# Patient Record
Sex: FEMALE | Race: WHITE | NOT HISPANIC OR LATINO | URBAN - METROPOLITAN AREA
[De-identification: names, ages, dates, MRNs, and addresses within clinical notes are randomized per-mention and may not be internally consistent; named-entity substitution may affect disease eponyms.]

---

## 2017-01-01 DIAGNOSIS — H90.3 SENSORINEURAL HEARING LOSS, BILATERAL: ICD-10-CM

## 2017-05-10 ENCOUNTER — OUTPATIENT (OUTPATIENT)
Dept: OUTPATIENT SERVICES | Facility: HOSPITAL | Age: 82
LOS: 1 days | Discharge: HOME | End: 2017-05-10

## 2018-01-01 ENCOUNTER — INPATIENT (INPATIENT)
Facility: HOSPITAL | Age: 83
LOS: 11 days | End: 2018-05-19
Attending: INTERNAL MEDICINE | Admitting: INTERNAL MEDICINE

## 2018-01-01 VITALS
RESPIRATION RATE: 20 BRPM | SYSTOLIC BLOOD PRESSURE: 98 MMHG | WEIGHT: 194.01 LBS | TEMPERATURE: 98 F | OXYGEN SATURATION: 96 % | HEART RATE: 116 BPM | DIASTOLIC BLOOD PRESSURE: 66 MMHG

## 2018-01-01 VITALS
HEART RATE: 116 BPM | TEMPERATURE: 98 F | DIASTOLIC BLOOD PRESSURE: 42 MMHG | SYSTOLIC BLOOD PRESSURE: 67 MMHG | RESPIRATION RATE: 19 BRPM

## 2018-01-01 DIAGNOSIS — K56.609 UNSPECIFIED INTESTINAL OBSTRUCTION, UNSPECIFIED AS TO PARTIAL VERSUS COMPLETE OBSTRUCTION: ICD-10-CM

## 2018-01-01 DIAGNOSIS — J69.0 PNEUMONITIS DUE TO INHALATION OF FOOD AND VOMIT: ICD-10-CM

## 2018-01-01 DIAGNOSIS — D49.0 NEOPLASM OF UNSPECIFIED BEHAVIOR OF DIGESTIVE SYSTEM: ICD-10-CM

## 2018-01-01 DIAGNOSIS — I10 ESSENTIAL (PRIMARY) HYPERTENSION: ICD-10-CM

## 2018-01-01 DIAGNOSIS — D64.9 ANEMIA, UNSPECIFIED: ICD-10-CM

## 2018-01-01 DIAGNOSIS — K56.601 COMPLETE INTESTINAL OBSTRUCTION, UNSPECIFIED AS TO CAUSE: ICD-10-CM

## 2018-01-01 LAB
ALBUMIN SERPL ELPH-MCNC: 2.2 G/DL — LOW (ref 3.5–5.2)
ALBUMIN SERPL ELPH-MCNC: 2.3 G/DL — LOW (ref 3.5–5.2)
ALBUMIN SERPL ELPH-MCNC: 2.4 G/DL — LOW (ref 3.5–5.2)
ALBUMIN SERPL ELPH-MCNC: 2.6 G/DL — LOW (ref 3.5–5.2)
ALBUMIN SERPL ELPH-MCNC: 2.7 G/DL — LOW (ref 3.5–5.2)
ALBUMIN SERPL ELPH-MCNC: 2.9 G/DL — LOW (ref 3.5–5.2)
ALLERGY+IMMUNOLOGY DIAG STUDY NOTE: SIGNIFICANT CHANGE UP
ALP SERPL-CCNC: 108 U/L — SIGNIFICANT CHANGE UP (ref 30–115)
ALP SERPL-CCNC: 62 U/L — SIGNIFICANT CHANGE UP (ref 30–115)
ALP SERPL-CCNC: 69 U/L — SIGNIFICANT CHANGE UP (ref 30–115)
ALP SERPL-CCNC: 78 U/L — SIGNIFICANT CHANGE UP (ref 30–115)
ALP SERPL-CCNC: 84 U/L — SIGNIFICANT CHANGE UP (ref 30–115)
ALP SERPL-CCNC: 87 U/L — SIGNIFICANT CHANGE UP (ref 30–115)
ALT FLD-CCNC: 6 U/L — SIGNIFICANT CHANGE UP (ref 0–41)
ALT FLD-CCNC: 6 U/L — SIGNIFICANT CHANGE UP (ref 0–41)
ALT FLD-CCNC: <5 U/L — SIGNIFICANT CHANGE UP (ref 0–41)
ANION GAP SERPL CALC-SCNC: 15 MMOL/L — HIGH (ref 7–14)
ANION GAP SERPL CALC-SCNC: 16 MMOL/L — HIGH (ref 7–14)
ANION GAP SERPL CALC-SCNC: 20 MMOL/L — HIGH (ref 7–14)
ANION GAP SERPL CALC-SCNC: 21 MMOL/L — HIGH (ref 7–14)
ANION GAP SERPL CALC-SCNC: 23 MMOL/L — HIGH (ref 7–14)
ANION GAP SERPL CALC-SCNC: 24 MMOL/L — HIGH (ref 7–14)
ANION GAP SERPL CALC-SCNC: 26 MMOL/L — HIGH (ref 7–14)
ANION GAP SERPL CALC-SCNC: 26 MMOL/L — HIGH (ref 7–14)
ANION GAP SERPL CALC-SCNC: 27 MMOL/L — HIGH (ref 7–14)
ANION GAP SERPL CALC-SCNC: 27 MMOL/L — HIGH (ref 7–14)
ANISOCYTOSIS BLD QL: SLIGHT — SIGNIFICANT CHANGE UP
APPEARANCE UR: (no result)
APTT BLD: 21.2 SEC — CRITICAL LOW (ref 27–39.2)
APTT BLD: 27.6 SEC — SIGNIFICANT CHANGE UP (ref 27–39.2)
APTT BLD: 31.4 SEC — SIGNIFICANT CHANGE UP (ref 27–39.2)
AST SERPL-CCNC: 10 U/L — SIGNIFICANT CHANGE UP (ref 0–41)
AST SERPL-CCNC: 11 U/L — SIGNIFICANT CHANGE UP (ref 0–41)
AST SERPL-CCNC: 12 U/L — SIGNIFICANT CHANGE UP (ref 0–41)
AST SERPL-CCNC: 12 U/L — SIGNIFICANT CHANGE UP (ref 0–41)
AST SERPL-CCNC: 13 U/L — SIGNIFICANT CHANGE UP (ref 0–41)
AST SERPL-CCNC: 13 U/L — SIGNIFICANT CHANGE UP (ref 0–41)
BACTERIA # UR AUTO: (no result) /HPF
BASE EXCESS BLDV CALC-SCNC: 9.9 MMOL/L — HIGH (ref -2–2)
BASOPHILS # BLD AUTO: 0 K/UL — SIGNIFICANT CHANGE UP (ref 0–0.2)
BASOPHILS NFR BLD AUTO: 0 % — SIGNIFICANT CHANGE UP (ref 0–1)
BILIRUB SERPL-MCNC: <0.2 MG/DL — SIGNIFICANT CHANGE UP (ref 0.2–1.2)
BILIRUB UR-MCNC: (no result)
BUN SERPL-MCNC: 11 MG/DL — SIGNIFICANT CHANGE UP (ref 10–20)
BUN SERPL-MCNC: 13 MG/DL — SIGNIFICANT CHANGE UP (ref 10–20)
BUN SERPL-MCNC: 14 MG/DL — SIGNIFICANT CHANGE UP (ref 10–20)
BUN SERPL-MCNC: 17 MG/DL — SIGNIFICANT CHANGE UP (ref 10–20)
BUN SERPL-MCNC: 19 MG/DL — SIGNIFICANT CHANGE UP (ref 10–20)
BUN SERPL-MCNC: 3 MG/DL — LOW (ref 10–20)
BUN SERPL-MCNC: 4 MG/DL — LOW (ref 10–20)
BUN SERPL-MCNC: 4 MG/DL — LOW (ref 10–20)
BUN SERPL-MCNC: 5 MG/DL — LOW (ref 10–20)
BUN SERPL-MCNC: 6 MG/DL — LOW (ref 10–20)
BUN SERPL-MCNC: <3 MG/DL — LOW (ref 10–20)
BUN SERPL-MCNC: <3 MG/DL — LOW (ref 10–20)
CA-I SERPL-SCNC: 1.19 MMOL/L — SIGNIFICANT CHANGE UP (ref 1.12–1.3)
CALCIUM SERPL-MCNC: 7.5 MG/DL — LOW (ref 8.5–10.1)
CALCIUM SERPL-MCNC: 7.6 MG/DL — LOW (ref 8.5–10.1)
CALCIUM SERPL-MCNC: 7.7 MG/DL — LOW (ref 8.5–10.1)
CALCIUM SERPL-MCNC: 7.8 MG/DL — LOW (ref 8.5–10.1)
CALCIUM SERPL-MCNC: 8 MG/DL — LOW (ref 8.5–10.1)
CALCIUM SERPL-MCNC: 8 MG/DL — LOW (ref 8.5–10.1)
CALCIUM SERPL-MCNC: 8.1 MG/DL — LOW (ref 8.5–10.1)
CALCIUM SERPL-MCNC: 8.3 MG/DL — LOW (ref 8.5–10.1)
CALCIUM SERPL-MCNC: 8.5 MG/DL — SIGNIFICANT CHANGE UP (ref 8.5–10.1)
CALCIUM SERPL-MCNC: 8.9 MG/DL — SIGNIFICANT CHANGE UP (ref 8.5–10.1)
CARBAMAZEPINE SERPL-MCNC: 3.4 UG/ML — LOW (ref 4–12)
CEA SERPL-MCNC: 20.7 NG/ML — HIGH (ref 0–3.8)
CHLORIDE SERPL-SCNC: 101 MMOL/L — SIGNIFICANT CHANGE UP (ref 98–110)
CHLORIDE SERPL-SCNC: 101 MMOL/L — SIGNIFICANT CHANGE UP (ref 98–110)
CHLORIDE SERPL-SCNC: 102 MMOL/L — SIGNIFICANT CHANGE UP (ref 98–110)
CHLORIDE SERPL-SCNC: 104 MMOL/L — SIGNIFICANT CHANGE UP (ref 98–110)
CHLORIDE SERPL-SCNC: 106 MMOL/L — SIGNIFICANT CHANGE UP (ref 98–110)
CHLORIDE SERPL-SCNC: 109 MMOL/L — SIGNIFICANT CHANGE UP (ref 98–110)
CHLORIDE SERPL-SCNC: 91 MMOL/L — LOW (ref 98–110)
CHLORIDE SERPL-SCNC: 95 MMOL/L — LOW (ref 98–110)
CO2 SERPL-SCNC: 18 MMOL/L — SIGNIFICANT CHANGE UP (ref 17–32)
CO2 SERPL-SCNC: 18 MMOL/L — SIGNIFICANT CHANGE UP (ref 17–32)
CO2 SERPL-SCNC: 19 MMOL/L — SIGNIFICANT CHANGE UP (ref 17–32)
CO2 SERPL-SCNC: 20 MMOL/L — SIGNIFICANT CHANGE UP (ref 17–32)
CO2 SERPL-SCNC: 21 MMOL/L — SIGNIFICANT CHANGE UP (ref 17–32)
CO2 SERPL-SCNC: 22 MMOL/L — SIGNIFICANT CHANGE UP (ref 17–32)
CO2 SERPL-SCNC: 23 MMOL/L — SIGNIFICANT CHANGE UP (ref 17–32)
CO2 SERPL-SCNC: 25 MMOL/L — SIGNIFICANT CHANGE UP (ref 17–32)
CO2 SERPL-SCNC: 25 MMOL/L — SIGNIFICANT CHANGE UP (ref 17–32)
CO2 SERPL-SCNC: 27 MMOL/L — SIGNIFICANT CHANGE UP (ref 17–32)
CO2 SERPL-SCNC: 29 MMOL/L — SIGNIFICANT CHANGE UP (ref 17–32)
CO2 SERPL-SCNC: 30 MMOL/L — SIGNIFICANT CHANGE UP (ref 17–32)
COD CRY URNS QL: NEGATIVE — SIGNIFICANT CHANGE UP
COLOR SPEC: YELLOW — SIGNIFICANT CHANGE UP
CREAT SERPL-MCNC: 0.5 MG/DL — LOW (ref 0.7–1.5)
CREAT SERPL-MCNC: 0.6 MG/DL — LOW (ref 0.7–1.5)
CREAT SERPL-MCNC: 0.7 MG/DL — SIGNIFICANT CHANGE UP (ref 0.7–1.5)
CREAT SERPL-MCNC: 0.7 MG/DL — SIGNIFICANT CHANGE UP (ref 0.7–1.5)
CULTURE RESULTS: SIGNIFICANT CHANGE UP
CULTURE RESULTS: SIGNIFICANT CHANGE UP
DIFF PNL FLD: (no result)
EOSINOPHIL NFR BLD AUTO: 0 % — SIGNIFICANT CHANGE UP (ref 0–8)
EPI CELLS # UR: (no result) /HPF
GAS PNL BLDV: 145 MMOL/L — SIGNIFICANT CHANGE UP (ref 136–145)
GAS PNL BLDV: SIGNIFICANT CHANGE UP
GLUCOSE SERPL-MCNC: 106 MG/DL — HIGH (ref 70–99)
GLUCOSE SERPL-MCNC: 106 MG/DL — HIGH (ref 70–99)
GLUCOSE SERPL-MCNC: 108 MG/DL — HIGH (ref 70–99)
GLUCOSE SERPL-MCNC: 108 MG/DL — HIGH (ref 70–99)
GLUCOSE SERPL-MCNC: 130 MG/DL — HIGH (ref 70–99)
GLUCOSE SERPL-MCNC: 139 MG/DL — HIGH (ref 70–99)
GLUCOSE SERPL-MCNC: 143 MG/DL — HIGH (ref 70–99)
GLUCOSE SERPL-MCNC: 72 MG/DL — SIGNIFICANT CHANGE UP (ref 70–99)
GLUCOSE SERPL-MCNC: 72 MG/DL — SIGNIFICANT CHANGE UP (ref 70–99)
GLUCOSE SERPL-MCNC: 75 MG/DL — SIGNIFICANT CHANGE UP (ref 70–99)
GLUCOSE SERPL-MCNC: 80 MG/DL — SIGNIFICANT CHANGE UP (ref 70–99)
GLUCOSE SERPL-MCNC: 86 MG/DL — SIGNIFICANT CHANGE UP (ref 70–99)
GLUCOSE UR QL: NEGATIVE MG/DL — SIGNIFICANT CHANGE UP
GRAN CASTS # UR COMP ASSIST: NEGATIVE — SIGNIFICANT CHANGE UP
HCO3 BLDV-SCNC: 36 MMOL/L — HIGH (ref 22–29)
HCT VFR BLD CALC: 27.3 % — LOW (ref 37–47)
HCT VFR BLD CALC: 27.8 % — LOW (ref 37–47)
HCT VFR BLD CALC: 28.9 % — LOW (ref 37–47)
HCT VFR BLD CALC: 29.1 % — LOW (ref 37–47)
HCT VFR BLD CALC: 29.4 % — LOW (ref 37–47)
HCT VFR BLD CALC: 30.7 % — LOW (ref 37–47)
HCT VFR BLD CALC: 31.4 % — LOW (ref 37–47)
HCT VFR BLD CALC: 36.4 % — LOW (ref 37–47)
HCT VFR BLDA CALC: 35.2 % — SIGNIFICANT CHANGE UP (ref 34–44)
HGB BLD CALC-MCNC: 11.5 G/DL — LOW (ref 14–18)
HGB BLD-MCNC: 11.3 G/DL — LOW (ref 12–16)
HGB BLD-MCNC: 8.2 G/DL — LOW (ref 12–16)
HGB BLD-MCNC: 8.6 G/DL — LOW (ref 12–16)
HGB BLD-MCNC: 8.7 G/DL — LOW (ref 12–16)
HGB BLD-MCNC: 8.8 G/DL — LOW (ref 12–16)
HGB BLD-MCNC: 8.8 G/DL — LOW (ref 12–16)
HGB BLD-MCNC: 9.1 G/DL — LOW (ref 12–16)
HGB BLD-MCNC: 9.6 G/DL — LOW (ref 12–16)
HOROWITZ INDEX BLDV+IHG-RTO: 21 — SIGNIFICANT CHANGE UP
HYALINE CASTS # UR AUTO: (no result) /LPF
HYPOCHROMIA BLD QL: SLIGHT — SIGNIFICANT CHANGE UP
INR BLD: 1.18 RATIO — SIGNIFICANT CHANGE UP (ref 0.65–1.3)
INR BLD: 1.53 RATIO — HIGH (ref 0.65–1.3)
INR BLD: 1.63 RATIO — HIGH (ref 0.65–1.3)
KETONES UR-MCNC: (no result)
LACTATE BLDV-MCNC: 2.2 MMOL/L — HIGH (ref 0.5–1.6)
LACTATE SERPL-SCNC: 1.9 MMOL/L — SIGNIFICANT CHANGE UP (ref 0.5–2.2)
LEUKOCYTE ESTERASE UR-ACNC: (no result)
LIDOCAIN IGE QN: 18 U/L — SIGNIFICANT CHANGE UP (ref 7–60)
LYMPHOCYTES # BLD AUTO: 2.01 K/UL — SIGNIFICANT CHANGE UP (ref 1.2–3.4)
LYMPHOCYTES # BLD AUTO: 7 % — LOW (ref 20.5–51.1)
MAGNESIUM SERPL-MCNC: 2 MG/DL — SIGNIFICANT CHANGE UP (ref 1.8–2.4)
MAGNESIUM SERPL-MCNC: 2.1 MG/DL — SIGNIFICANT CHANGE UP (ref 1.8–2.4)
MAGNESIUM SERPL-MCNC: 2.1 MG/DL — SIGNIFICANT CHANGE UP (ref 1.8–2.4)
MAGNESIUM SERPL-MCNC: 2.2 MG/DL — SIGNIFICANT CHANGE UP (ref 1.8–2.4)
MAGNESIUM SERPL-MCNC: 2.2 MG/DL — SIGNIFICANT CHANGE UP (ref 1.8–2.4)
MAGNESIUM SERPL-MCNC: 2.3 MG/DL — SIGNIFICANT CHANGE UP (ref 1.8–2.4)
MANUAL SMEAR VERIFICATION: SIGNIFICANT CHANGE UP
MCHC RBC-ENTMCNC: 27.1 PG — SIGNIFICANT CHANGE UP (ref 27–31)
MCHC RBC-ENTMCNC: 27.1 PG — SIGNIFICANT CHANGE UP (ref 27–31)
MCHC RBC-ENTMCNC: 27.4 PG — SIGNIFICANT CHANGE UP (ref 27–31)
MCHC RBC-ENTMCNC: 27.5 PG — SIGNIFICANT CHANGE UP (ref 27–31)
MCHC RBC-ENTMCNC: 27.7 PG — SIGNIFICANT CHANGE UP (ref 27–31)
MCHC RBC-ENTMCNC: 27.8 PG — SIGNIFICANT CHANGE UP (ref 27–31)
MCHC RBC-ENTMCNC: 27.8 PG — SIGNIFICANT CHANGE UP (ref 27–31)
MCHC RBC-ENTMCNC: 28.2 PG — SIGNIFICANT CHANGE UP (ref 27–31)
MCHC RBC-ENTMCNC: 29.6 G/DL — LOW (ref 32–37)
MCHC RBC-ENTMCNC: 29.6 G/DL — LOW (ref 32–37)
MCHC RBC-ENTMCNC: 29.9 G/DL — LOW (ref 32–37)
MCHC RBC-ENTMCNC: 30 G/DL — LOW (ref 32–37)
MCHC RBC-ENTMCNC: 30.4 G/DL — LOW (ref 32–37)
MCHC RBC-ENTMCNC: 30.6 G/DL — LOW (ref 32–37)
MCHC RBC-ENTMCNC: 31 G/DL — LOW (ref 32–37)
MCHC RBC-ENTMCNC: 31.3 G/DL — LOW (ref 32–37)
MCV RBC AUTO: 89.4 FL — SIGNIFICANT CHANGE UP (ref 81–99)
MCV RBC AUTO: 90 FL — SIGNIFICANT CHANGE UP (ref 81–99)
MCV RBC AUTO: 90.1 FL — SIGNIFICANT CHANGE UP (ref 81–99)
MCV RBC AUTO: 90.3 FL — SIGNIFICANT CHANGE UP (ref 81–99)
MCV RBC AUTO: 90.8 FL — SIGNIFICANT CHANGE UP (ref 81–99)
MCV RBC AUTO: 91.4 FL — SIGNIFICANT CHANGE UP (ref 81–99)
MCV RBC AUTO: 92.7 FL — SIGNIFICANT CHANGE UP (ref 81–99)
MCV RBC AUTO: 93 FL — SIGNIFICANT CHANGE UP (ref 81–99)
MONOCYTES # BLD AUTO: 2.3 K/UL — HIGH (ref 0.1–0.6)
MONOCYTES NFR BLD AUTO: 8 % — SIGNIFICANT CHANGE UP (ref 1.7–9.3)
NEUTROPHILS # BLD AUTO: 24.44 K/UL — HIGH (ref 1.4–6.5)
NEUTROPHILS NFR BLD AUTO: 85 % — HIGH (ref 42.2–75.2)
NITRITE UR-MCNC: NEGATIVE — SIGNIFICANT CHANGE UP
NRBC # BLD: 0 /100 WBCS — SIGNIFICANT CHANGE UP (ref 0–0)
NRBC # BLD: 0 /100 — SIGNIFICANT CHANGE UP (ref 0–0)
NRBC # BLD: SIGNIFICANT CHANGE UP /100 WBCS (ref 0–0)
PCO2 BLDV: 57 MMHG — HIGH (ref 41–51)
PH BLDV: 7.41 — SIGNIFICANT CHANGE UP (ref 7.26–7.43)
PH UR: 6 — SIGNIFICANT CHANGE UP (ref 5–8)
PHOSPHATE SERPL-MCNC: 1.1 MG/DL — LOW (ref 2.1–4.9)
PHOSPHATE SERPL-MCNC: 1.7 MG/DL — LOW (ref 2.1–4.9)
PHOSPHATE SERPL-MCNC: 1.9 MG/DL — LOW (ref 2.1–4.9)
PLAT MORPH BLD: (no result)
PLATELET # BLD AUTO: 427 K/UL — HIGH (ref 130–400)
PLATELET # BLD AUTO: 477 K/UL — HIGH (ref 130–400)
PLATELET # BLD AUTO: 485 K/UL — HIGH (ref 130–400)
PLATELET # BLD AUTO: 535 K/UL — HIGH (ref 130–400)
PLATELET # BLD AUTO: 565 K/UL — HIGH (ref 130–400)
PLATELET # BLD AUTO: 577 K/UL — HIGH (ref 130–400)
PLATELET # BLD AUTO: 614 K/UL — HIGH (ref 130–400)
PLATELET # BLD AUTO: 677 K/UL — HIGH (ref 130–400)
PLATELET COUNT - ESTIMATE: (no result)
PO2 BLDV: 37 MMHG — SIGNIFICANT CHANGE UP (ref 20–40)
POIKILOCYTOSIS BLD QL AUTO: SLIGHT — SIGNIFICANT CHANGE UP
POTASSIUM BLDV-SCNC: 2.9 MMOL/L — LOW (ref 3.3–5.6)
POTASSIUM SERPL-MCNC: 3.2 MMOL/L — LOW (ref 3.5–5)
POTASSIUM SERPL-MCNC: 3.2 MMOL/L — LOW (ref 3.5–5)
POTASSIUM SERPL-MCNC: 3.3 MMOL/L — LOW (ref 3.5–5)
POTASSIUM SERPL-MCNC: 3.6 MMOL/L — SIGNIFICANT CHANGE UP (ref 3.5–5)
POTASSIUM SERPL-MCNC: 3.6 MMOL/L — SIGNIFICANT CHANGE UP (ref 3.5–5)
POTASSIUM SERPL-MCNC: 3.7 MMOL/L — SIGNIFICANT CHANGE UP (ref 3.5–5)
POTASSIUM SERPL-MCNC: 3.7 MMOL/L — SIGNIFICANT CHANGE UP (ref 3.5–5)
POTASSIUM SERPL-MCNC: 3.8 MMOL/L — SIGNIFICANT CHANGE UP (ref 3.5–5)
POTASSIUM SERPL-MCNC: 3.9 MMOL/L — SIGNIFICANT CHANGE UP (ref 3.5–5)
POTASSIUM SERPL-MCNC: 4 MMOL/L — SIGNIFICANT CHANGE UP (ref 3.5–5)
POTASSIUM SERPL-MCNC: 4.1 MMOL/L — SIGNIFICANT CHANGE UP (ref 3.5–5)
POTASSIUM SERPL-MCNC: 4.1 MMOL/L — SIGNIFICANT CHANGE UP (ref 3.5–5)
POTASSIUM SERPL-SCNC: 3.2 MMOL/L — LOW (ref 3.5–5)
POTASSIUM SERPL-SCNC: 3.2 MMOL/L — LOW (ref 3.5–5)
POTASSIUM SERPL-SCNC: 3.3 MMOL/L — LOW (ref 3.5–5)
POTASSIUM SERPL-SCNC: 3.6 MMOL/L — SIGNIFICANT CHANGE UP (ref 3.5–5)
POTASSIUM SERPL-SCNC: 3.6 MMOL/L — SIGNIFICANT CHANGE UP (ref 3.5–5)
POTASSIUM SERPL-SCNC: 3.7 MMOL/L — SIGNIFICANT CHANGE UP (ref 3.5–5)
POTASSIUM SERPL-SCNC: 3.7 MMOL/L — SIGNIFICANT CHANGE UP (ref 3.5–5)
POTASSIUM SERPL-SCNC: 3.8 MMOL/L — SIGNIFICANT CHANGE UP (ref 3.5–5)
POTASSIUM SERPL-SCNC: 3.9 MMOL/L — SIGNIFICANT CHANGE UP (ref 3.5–5)
POTASSIUM SERPL-SCNC: 4 MMOL/L — SIGNIFICANT CHANGE UP (ref 3.5–5)
POTASSIUM SERPL-SCNC: 4.1 MMOL/L — SIGNIFICANT CHANGE UP (ref 3.5–5)
POTASSIUM SERPL-SCNC: 4.1 MMOL/L — SIGNIFICANT CHANGE UP (ref 3.5–5)
PROT SERPL-MCNC: 4.8 G/DL — LOW (ref 6–8)
PROT SERPL-MCNC: 5 G/DL — LOW (ref 6–8)
PROT SERPL-MCNC: 5.1 G/DL — LOW (ref 6–8)
PROT SERPL-MCNC: 5.6 G/DL — LOW (ref 6–8)
PROT UR-MCNC: 30 MG/DL
PROTHROM AB SERPL-ACNC: 12.8 SEC — SIGNIFICANT CHANGE UP (ref 9.95–12.87)
PROTHROM AB SERPL-ACNC: 16.7 SEC — HIGH (ref 9.95–12.87)
PROTHROM AB SERPL-ACNC: 17.8 SEC — HIGH (ref 9.95–12.87)
RBC # BLD: 3.03 M/UL — LOW (ref 4.2–5.4)
RBC # BLD: 3.09 M/UL — LOW (ref 4.2–5.4)
RBC # BLD: 3.14 M/UL — LOW (ref 4.2–5.4)
RBC # BLD: 3.16 M/UL — LOW (ref 4.2–5.4)
RBC # BLD: 3.2 M/UL — LOW (ref 4.2–5.4)
RBC # BLD: 3.36 M/UL — LOW (ref 4.2–5.4)
RBC # BLD: 3.46 M/UL — LOW (ref 4.2–5.4)
RBC # BLD: 4.07 M/UL — LOW (ref 4.2–5.4)
RBC # FLD: 16.3 % — HIGH (ref 11.5–14.5)
RBC # FLD: 16.3 % — HIGH (ref 11.5–14.5)
RBC # FLD: 16.4 % — HIGH (ref 11.5–14.5)
RBC # FLD: 17 % — HIGH (ref 11.5–14.5)
RBC # FLD: 17.4 % — HIGH (ref 11.5–14.5)
RBC # FLD: 18.1 % — HIGH (ref 11.5–14.5)
RBC # FLD: 18.1 % — HIGH (ref 11.5–14.5)
RBC # FLD: 18.2 % — HIGH (ref 11.5–14.5)
RBC BLD AUTO: (no result)
RBC CASTS # UR COMP ASSIST: (no result) /HPF
SAO2 % BLDV: 62 % — SIGNIFICANT CHANGE UP
SODIUM SERPL-SCNC: 143 MMOL/L — SIGNIFICANT CHANGE UP (ref 135–146)
SODIUM SERPL-SCNC: 144 MMOL/L — SIGNIFICANT CHANGE UP (ref 135–146)
SODIUM SERPL-SCNC: 145 MMOL/L — SIGNIFICANT CHANGE UP (ref 135–146)
SODIUM SERPL-SCNC: 146 MMOL/L — SIGNIFICANT CHANGE UP (ref 135–146)
SODIUM SERPL-SCNC: 147 MMOL/L — HIGH (ref 135–146)
SODIUM SERPL-SCNC: 148 MMOL/L — HIGH (ref 135–146)
SODIUM SERPL-SCNC: 148 MMOL/L — HIGH (ref 135–146)
SODIUM SERPL-SCNC: 151 MMOL/L — HIGH (ref 135–146)
SODIUM SERPL-SCNC: 152 MMOL/L — HIGH (ref 135–146)
SODIUM SERPL-SCNC: 153 MMOL/L — HIGH (ref 135–146)
SODIUM SERPL-SCNC: 155 MMOL/L — HIGH (ref 135–146)
SODIUM SERPL-SCNC: 155 MMOL/L — HIGH (ref 135–146)
SP GR SPEC: 1.02 — SIGNIFICANT CHANGE UP (ref 1.01–1.03)
SPECIMEN SOURCE: SIGNIFICANT CHANGE UP
SPECIMEN SOURCE: SIGNIFICANT CHANGE UP
TRI-PHOS CRY UR QL COMP ASSIST: NEGATIVE — SIGNIFICANT CHANGE UP
TYPE + AB SCN PNL BLD: SIGNIFICANT CHANGE UP
URATE CRY FLD QL MICRO: NEGATIVE — SIGNIFICANT CHANGE UP
UROBILINOGEN FLD QL: 0.2 MG/DL — SIGNIFICANT CHANGE UP (ref 0.2–0.2)
WBC # BLD: 10.15 K/UL — SIGNIFICANT CHANGE UP (ref 4.8–10.8)
WBC # BLD: 11.01 K/UL — HIGH (ref 4.8–10.8)
WBC # BLD: 11.38 K/UL — HIGH (ref 4.8–10.8)
WBC # BLD: 11.79 K/UL — HIGH (ref 4.8–10.8)
WBC # BLD: 13.24 K/UL — HIGH (ref 4.8–10.8)
WBC # BLD: 21.84 K/UL — HIGH (ref 4.8–10.8)
WBC # BLD: 23.12 K/UL — HIGH (ref 4.8–10.8)
WBC # BLD: 28.75 K/UL — HIGH (ref 4.8–10.8)
WBC # FLD AUTO: 10.15 K/UL — SIGNIFICANT CHANGE UP (ref 4.8–10.8)
WBC # FLD AUTO: 11.01 K/UL — HIGH (ref 4.8–10.8)
WBC # FLD AUTO: 11.38 K/UL — HIGH (ref 4.8–10.8)
WBC # FLD AUTO: 11.79 K/UL — HIGH (ref 4.8–10.8)
WBC # FLD AUTO: 13.24 K/UL — HIGH (ref 4.8–10.8)
WBC # FLD AUTO: 21.84 K/UL — HIGH (ref 4.8–10.8)
WBC # FLD AUTO: 23.12 K/UL — HIGH (ref 4.8–10.8)
WBC # FLD AUTO: 28.75 K/UL — HIGH (ref 4.8–10.8)
WBC UR QL: (no result) /HPF

## 2018-01-01 RX ORDER — SODIUM CHLORIDE 9 MG/ML
1000 INJECTION, SOLUTION INTRAVENOUS
Qty: 0 | Refills: 0 | Status: DISCONTINUED | OUTPATIENT
Start: 2018-01-01 | End: 2018-01-01

## 2018-01-01 RX ORDER — FENTANYL CITRATE 50 UG/ML
1 INJECTION INTRAVENOUS
Qty: 0 | Refills: 0 | Status: DISCONTINUED | OUTPATIENT
Start: 2018-01-01 | End: 2018-01-01

## 2018-01-01 RX ORDER — IPRATROPIUM/ALBUTEROL SULFATE 18-103MCG
3 AEROSOL WITH ADAPTER (GRAM) INHALATION
Qty: 0 | Refills: 0 | COMMUNITY

## 2018-01-01 RX ORDER — CLOPIDOGREL BISULFATE 75 MG/1
75 TABLET, FILM COATED ORAL DAILY
Qty: 0 | Refills: 0 | Status: DISCONTINUED | OUTPATIENT
Start: 2018-01-01 | End: 2018-01-01

## 2018-01-01 RX ORDER — SODIUM CHLORIDE 9 MG/ML
3 INJECTION INTRAMUSCULAR; INTRAVENOUS; SUBCUTANEOUS ONCE
Qty: 0 | Refills: 0 | Status: COMPLETED | OUTPATIENT
Start: 2018-01-01 | End: 2018-01-01

## 2018-01-01 RX ORDER — RANITIDINE HYDROCHLORIDE 150 MG/1
0 TABLET, FILM COATED ORAL
Qty: 0 | Refills: 0 | COMMUNITY

## 2018-01-01 RX ORDER — DIATRIZOATE MEGLUMINE 180 MG/ML
20 INJECTION, SOLUTION INTRAVESICAL ONCE
Qty: 0 | Refills: 0 | Status: COMPLETED | OUTPATIENT
Start: 2018-01-01 | End: 2018-01-01

## 2018-01-01 RX ORDER — HEPARIN SODIUM 5000 [USP'U]/ML
5000 INJECTION INTRAVENOUS; SUBCUTANEOUS EVERY 12 HOURS
Qty: 0 | Refills: 0 | Status: DISCONTINUED | OUTPATIENT
Start: 2018-01-01 | End: 2018-01-01

## 2018-01-01 RX ORDER — POTASSIUM CHLORIDE 20 MEQ
20 PACKET (EA) ORAL ONCE
Qty: 0 | Refills: 0 | Status: COMPLETED | OUTPATIENT
Start: 2018-01-01 | End: 2018-01-01

## 2018-01-01 RX ORDER — CLOPIDOGREL BISULFATE 75 MG/1
1 TABLET, FILM COATED ORAL
Qty: 0 | Refills: 0 | COMMUNITY

## 2018-01-01 RX ORDER — METOPROLOL TARTRATE 50 MG
5 TABLET ORAL EVERY 6 HOURS
Qty: 0 | Refills: 0 | Status: DISCONTINUED | OUTPATIENT
Start: 2018-01-01 | End: 2018-01-01

## 2018-01-01 RX ORDER — METOPROLOL TARTRATE 50 MG
1 TABLET ORAL
Qty: 0 | Refills: 0 | COMMUNITY

## 2018-01-01 RX ORDER — METOPROLOL TARTRATE 50 MG
50 TABLET ORAL
Qty: 0 | Refills: 0 | Status: DISCONTINUED | OUTPATIENT
Start: 2018-01-01 | End: 2018-01-01

## 2018-01-01 RX ORDER — CETIRIZINE HYDROCHLORIDE 10 MG/1
10 TABLET ORAL DAILY
Qty: 0 | Refills: 0 | Status: DISCONTINUED | OUTPATIENT
Start: 2018-01-01 | End: 2018-01-01

## 2018-01-01 RX ORDER — SERTRALINE 25 MG/1
25 TABLET, FILM COATED ORAL DAILY
Qty: 0 | Refills: 0 | Status: DISCONTINUED | OUTPATIENT
Start: 2018-01-01 | End: 2018-01-01

## 2018-01-01 RX ORDER — CETIRIZINE HYDROCHLORIDE 10 MG/1
1 TABLET ORAL
Qty: 0 | Refills: 0 | COMMUNITY

## 2018-01-01 RX ORDER — CARBAMAZEPINE 200 MG
200 TABLET ORAL
Qty: 0 | Refills: 0 | Status: DISCONTINUED | OUTPATIENT
Start: 2018-01-01 | End: 2018-01-01

## 2018-01-01 RX ORDER — PANTOPRAZOLE SODIUM 20 MG/1
1 TABLET, DELAYED RELEASE ORAL
Qty: 0 | Refills: 0 | COMMUNITY

## 2018-01-01 RX ORDER — ZOLPIDEM TARTRATE 10 MG/1
5 TABLET ORAL AT BEDTIME
Qty: 0 | Refills: 0 | Status: DISCONTINUED | OUTPATIENT
Start: 2018-01-01 | End: 2018-01-01

## 2018-01-01 RX ORDER — FENTANYL CITRATE 50 UG/ML
1 INJECTION INTRAVENOUS
Qty: 0 | Refills: 0 | COMMUNITY

## 2018-01-01 RX ORDER — GABAPENTIN 400 MG/1
0 CAPSULE ORAL
Qty: 0 | Refills: 0 | COMMUNITY

## 2018-01-01 RX ORDER — SODIUM CHLORIDE 9 MG/ML
1000 INJECTION INTRAMUSCULAR; INTRAVENOUS; SUBCUTANEOUS ONCE
Qty: 0 | Refills: 0 | Status: COMPLETED | OUTPATIENT
Start: 2018-01-01 | End: 2018-01-01

## 2018-01-01 RX ORDER — SODIUM CHLORIDE 9 MG/ML
1000 INJECTION INTRAMUSCULAR; INTRAVENOUS; SUBCUTANEOUS
Qty: 0 | Refills: 0 | Status: DISCONTINUED | OUTPATIENT
Start: 2018-01-01 | End: 2018-01-01

## 2018-01-01 RX ORDER — KETOROLAC TROMETHAMINE 30 MG/ML
15 SYRINGE (ML) INJECTION ONCE
Qty: 0 | Refills: 0 | Status: DISCONTINUED | OUTPATIENT
Start: 2018-01-01 | End: 2018-01-01

## 2018-01-01 RX ORDER — BUDESONIDE, MICRONIZED 100 %
0.5 POWDER (GRAM) MISCELLANEOUS
Qty: 0 | Refills: 0 | Status: DISCONTINUED | OUTPATIENT
Start: 2018-01-01 | End: 2018-01-01

## 2018-01-01 RX ORDER — MORPHINE SULFATE 50 MG/1
2 CAPSULE, EXTENDED RELEASE ORAL ONCE
Qty: 0 | Refills: 0 | Status: DISCONTINUED | OUTPATIENT
Start: 2018-01-01 | End: 2018-01-01

## 2018-01-01 RX ORDER — LACTOBACILLUS ACIDOPHILUS 100MM CELL
1 CAPSULE ORAL DAILY
Qty: 0 | Refills: 0 | Status: DISCONTINUED | OUTPATIENT
Start: 2018-01-01 | End: 2018-01-01

## 2018-01-01 RX ORDER — PIPERACILLIN AND TAZOBACTAM 4; .5 G/20ML; G/20ML
3.38 INJECTION, POWDER, LYOPHILIZED, FOR SOLUTION INTRAVENOUS EVERY 8 HOURS
Qty: 0 | Refills: 0 | Status: DISCONTINUED | OUTPATIENT
Start: 2018-01-01 | End: 2018-01-01

## 2018-01-01 RX ORDER — MORPHINE SULFATE 50 MG/1
2 CAPSULE, EXTENDED RELEASE ORAL
Qty: 0 | Refills: 0 | Status: DISCONTINUED | OUTPATIENT
Start: 2018-01-01 | End: 2018-01-01

## 2018-01-01 RX ORDER — ACETAMINOPHEN 500 MG
650 TABLET ORAL EVERY 6 HOURS
Qty: 0 | Refills: 0 | Status: DISCONTINUED | OUTPATIENT
Start: 2018-01-01 | End: 2018-01-01

## 2018-01-01 RX ORDER — FUROSEMIDE 40 MG
1 TABLET ORAL
Qty: 0 | Refills: 0 | COMMUNITY

## 2018-01-01 RX ORDER — CEFEPIME 1 G/1
1000 INJECTION, POWDER, FOR SOLUTION INTRAMUSCULAR; INTRAVENOUS EVERY 12 HOURS
Qty: 0 | Refills: 0 | Status: DISCONTINUED | OUTPATIENT
Start: 2018-01-01 | End: 2018-01-01

## 2018-01-01 RX ORDER — MONTELUKAST 4 MG/1
1 TABLET, CHEWABLE ORAL
Qty: 0 | Refills: 0 | COMMUNITY

## 2018-01-01 RX ORDER — POTASSIUM CHLORIDE 20 MEQ
20 PACKET (EA) ORAL
Qty: 0 | Refills: 0 | Status: COMPLETED | OUTPATIENT
Start: 2018-01-01 | End: 2018-01-01

## 2018-01-01 RX ORDER — BUDESONIDE, MICRONIZED 100 %
0 POWDER (GRAM) MISCELLANEOUS
Qty: 0 | Refills: 0 | COMMUNITY

## 2018-01-01 RX ORDER — ACETAMINOPHEN 500 MG
650 TABLET ORAL EVERY 8 HOURS
Qty: 0 | Refills: 0 | Status: DISCONTINUED | OUTPATIENT
Start: 2018-01-01 | End: 2018-01-01

## 2018-01-01 RX ORDER — VANCOMYCIN HCL 1 G
1000 VIAL (EA) INTRAVENOUS ONCE
Qty: 0 | Refills: 0 | Status: COMPLETED | OUTPATIENT
Start: 2018-01-01 | End: 2018-01-01

## 2018-01-01 RX ORDER — SERTRALINE 25 MG/1
1 TABLET, FILM COATED ORAL
Qty: 0 | Refills: 0 | COMMUNITY

## 2018-01-01 RX ORDER — SERTRALINE 25 MG/1
50 TABLET, FILM COATED ORAL DAILY
Qty: 0 | Refills: 0 | Status: DISCONTINUED | OUTPATIENT
Start: 2018-01-01 | End: 2018-01-01

## 2018-01-01 RX ORDER — LEVETIRACETAM 250 MG/1
250 TABLET, FILM COATED ORAL ONCE
Qty: 0 | Refills: 0 | Status: COMPLETED | OUTPATIENT
Start: 2018-01-01 | End: 2018-01-01

## 2018-01-01 RX ORDER — LEVETIRACETAM 250 MG/1
375 TABLET, FILM COATED ORAL EVERY 12 HOURS
Qty: 0 | Refills: 0 | Status: DISCONTINUED | OUTPATIENT
Start: 2018-01-01 | End: 2018-01-01

## 2018-01-01 RX ORDER — LACTOBACILLUS ACIDOPHILUS 100MM CELL
1 CAPSULE ORAL
Qty: 0 | Refills: 0 | COMMUNITY

## 2018-01-01 RX ORDER — MORPHINE SULFATE 50 MG/1
2 CAPSULE, EXTENDED RELEASE ORAL
Qty: 100 | Refills: 0 | Status: DISCONTINUED | OUTPATIENT
Start: 2018-01-01 | End: 2018-01-01

## 2018-01-01 RX ORDER — CARBAMAZEPINE 200 MG
0 TABLET ORAL
Qty: 0 | Refills: 0 | COMMUNITY

## 2018-01-01 RX ORDER — HYDRALAZINE HCL 50 MG
25 TABLET ORAL EVERY 12 HOURS
Qty: 0 | Refills: 0 | Status: DISCONTINUED | OUTPATIENT
Start: 2018-01-01 | End: 2018-01-01

## 2018-01-01 RX ORDER — PIPERACILLIN AND TAZOBACTAM 4; .5 G/20ML; G/20ML
3.38 INJECTION, POWDER, LYOPHILIZED, FOR SOLUTION INTRAVENOUS ONCE
Qty: 0 | Refills: 0 | Status: COMPLETED | OUTPATIENT
Start: 2018-01-01 | End: 2018-01-01

## 2018-01-01 RX ORDER — MONTELUKAST 4 MG/1
10 TABLET, CHEWABLE ORAL DAILY
Qty: 0 | Refills: 0 | Status: DISCONTINUED | OUTPATIENT
Start: 2018-01-01 | End: 2018-01-01

## 2018-01-01 RX ORDER — GABAPENTIN 400 MG/1
300 CAPSULE ORAL
Qty: 0 | Refills: 0 | Status: DISCONTINUED | OUTPATIENT
Start: 2018-01-01 | End: 2018-01-01

## 2018-01-01 RX ORDER — SODIUM CHLORIDE 9 MG/ML
500 INJECTION INTRAMUSCULAR; INTRAVENOUS; SUBCUTANEOUS ONCE
Qty: 0 | Refills: 0 | Status: COMPLETED | OUTPATIENT
Start: 2018-01-01 | End: 2018-01-01

## 2018-01-01 RX ORDER — TEMAZEPAM 15 MG/1
15 CAPSULE ORAL AT BEDTIME
Qty: 0 | Refills: 0 | Status: DISCONTINUED | OUTPATIENT
Start: 2018-01-01 | End: 2018-01-01

## 2018-01-01 RX ORDER — PANTOPRAZOLE SODIUM 20 MG/1
40 TABLET, DELAYED RELEASE ORAL
Qty: 0 | Refills: 0 | Status: DISCONTINUED | OUTPATIENT
Start: 2018-01-01 | End: 2018-01-01

## 2018-01-01 RX ORDER — HYDRALAZINE HCL 50 MG
0 TABLET ORAL
Qty: 0 | Refills: 0 | COMMUNITY

## 2018-01-01 RX ORDER — LEVETIRACETAM 250 MG/1
250 TABLET, FILM COATED ORAL EVERY 12 HOURS
Qty: 0 | Refills: 0 | Status: DISCONTINUED | OUTPATIENT
Start: 2018-01-01 | End: 2018-01-01

## 2018-01-01 RX ORDER — ONDANSETRON 8 MG/1
4 TABLET, FILM COATED ORAL EVERY 6 HOURS
Qty: 0 | Refills: 0 | Status: DISCONTINUED | OUTPATIENT
Start: 2018-01-01 | End: 2018-01-01

## 2018-01-01 RX ORDER — ONDANSETRON 8 MG/1
4 TABLET, FILM COATED ORAL ONCE
Qty: 0 | Refills: 0 | Status: COMPLETED | OUTPATIENT
Start: 2018-01-01 | End: 2018-01-01

## 2018-01-01 RX ORDER — DOCUSATE SODIUM 100 MG
100 CAPSULE ORAL DAILY
Qty: 0 | Refills: 0 | Status: DISCONTINUED | OUTPATIENT
Start: 2018-01-01 | End: 2018-01-01

## 2018-01-01 RX ORDER — IPRATROPIUM/ALBUTEROL SULFATE 18-103MCG
3 AEROSOL WITH ADAPTER (GRAM) INHALATION EVERY 6 HOURS
Qty: 0 | Refills: 0 | Status: DISCONTINUED | OUTPATIENT
Start: 2018-01-01 | End: 2018-01-01

## 2018-01-01 RX ORDER — FUROSEMIDE 40 MG
20 TABLET ORAL DAILY
Qty: 0 | Refills: 0 | Status: DISCONTINUED | OUTPATIENT
Start: 2018-01-01 | End: 2018-01-01

## 2018-01-01 RX ADMIN — LEVETIRACETAM 415 MILLIGRAM(S): 250 TABLET, FILM COATED ORAL at 05:22

## 2018-01-01 RX ADMIN — TEMAZEPAM 15 MILLIGRAM(S): 15 CAPSULE ORAL at 22:07

## 2018-01-01 RX ADMIN — Medication 200 MILLIGRAM(S): at 18:42

## 2018-01-01 RX ADMIN — LEVETIRACETAM 415 MILLIGRAM(S): 250 TABLET, FILM COATED ORAL at 18:27

## 2018-01-01 RX ADMIN — SODIUM CHLORIDE 125 MILLILITER(S): 9 INJECTION INTRAMUSCULAR; INTRAVENOUS; SUBCUTANEOUS at 13:50

## 2018-01-01 RX ADMIN — LEVETIRACETAM 415 MILLIGRAM(S): 250 TABLET, FILM COATED ORAL at 05:49

## 2018-01-01 RX ADMIN — PIPERACILLIN AND TAZOBACTAM 25 GRAM(S): 4; .5 INJECTION, POWDER, LYOPHILIZED, FOR SOLUTION INTRAVENOUS at 21:03

## 2018-01-01 RX ADMIN — HEPARIN SODIUM 5000 UNIT(S): 5000 INJECTION INTRAVENOUS; SUBCUTANEOUS at 06:33

## 2018-01-01 RX ADMIN — PIPERACILLIN AND TAZOBACTAM 25 GRAM(S): 4; .5 INJECTION, POWDER, LYOPHILIZED, FOR SOLUTION INTRAVENOUS at 22:11

## 2018-01-01 RX ADMIN — SODIUM CHLORIDE 500 MILLILITER(S): 9 INJECTION INTRAMUSCULAR; INTRAVENOUS; SUBCUTANEOUS at 12:41

## 2018-01-01 RX ADMIN — HEPARIN SODIUM 5000 UNIT(S): 5000 INJECTION INTRAVENOUS; SUBCUTANEOUS at 18:28

## 2018-01-01 RX ADMIN — FENTANYL CITRATE 1 PATCH: 50 INJECTION INTRAVENOUS at 15:17

## 2018-01-01 RX ADMIN — Medication 650 MILLIGRAM(S): at 01:36

## 2018-01-01 RX ADMIN — PIPERACILLIN AND TAZOBACTAM 25 GRAM(S): 4; .5 INJECTION, POWDER, LYOPHILIZED, FOR SOLUTION INTRAVENOUS at 14:29

## 2018-01-01 RX ADMIN — MORPHINE SULFATE 2 MILLIGRAM(S): 50 CAPSULE, EXTENDED RELEASE ORAL at 04:37

## 2018-01-01 RX ADMIN — FENTANYL CITRATE 1 PATCH: 50 INJECTION INTRAVENOUS at 11:32

## 2018-01-01 RX ADMIN — LEVETIRACETAM 415 MILLIGRAM(S): 250 TABLET, FILM COATED ORAL at 05:08

## 2018-01-01 RX ADMIN — ONDANSETRON 4 MILLIGRAM(S): 8 TABLET, FILM COATED ORAL at 13:50

## 2018-01-01 RX ADMIN — Medication 200 MILLIGRAM(S): at 17:35

## 2018-01-01 RX ADMIN — MORPHINE SULFATE 2 MILLIGRAM(S): 50 CAPSULE, EXTENDED RELEASE ORAL at 13:02

## 2018-01-01 RX ADMIN — PIPERACILLIN AND TAZOBACTAM 25 GRAM(S): 4; .5 INJECTION, POWDER, LYOPHILIZED, FOR SOLUTION INTRAVENOUS at 07:26

## 2018-01-01 RX ADMIN — Medication 250 MILLIGRAM(S): at 19:01

## 2018-01-01 RX ADMIN — PIPERACILLIN AND TAZOBACTAM 25 GRAM(S): 4; .5 INJECTION, POWDER, LYOPHILIZED, FOR SOLUTION INTRAVENOUS at 15:16

## 2018-01-01 RX ADMIN — PIPERACILLIN AND TAZOBACTAM 25 GRAM(S): 4; .5 INJECTION, POWDER, LYOPHILIZED, FOR SOLUTION INTRAVENOUS at 14:55

## 2018-01-01 RX ADMIN — MORPHINE SULFATE 2 MILLIGRAM(S): 50 CAPSULE, EXTENDED RELEASE ORAL at 21:33

## 2018-01-01 RX ADMIN — MONTELUKAST 10 MILLIGRAM(S): 4 TABLET, CHEWABLE ORAL at 11:38

## 2018-01-01 RX ADMIN — Medication 650 MILLIGRAM(S): at 21:20

## 2018-01-01 RX ADMIN — FENTANYL CITRATE 1 PATCH: 50 INJECTION INTRAVENOUS at 08:22

## 2018-01-01 RX ADMIN — MORPHINE SULFATE 2 MILLIGRAM(S): 50 CAPSULE, EXTENDED RELEASE ORAL at 05:00

## 2018-01-01 RX ADMIN — MORPHINE SULFATE 2 MILLIGRAM(S): 50 CAPSULE, EXTENDED RELEASE ORAL at 13:55

## 2018-01-01 RX ADMIN — MORPHINE SULFATE 2 MILLIGRAM(S): 50 CAPSULE, EXTENDED RELEASE ORAL at 02:05

## 2018-01-01 RX ADMIN — PIPERACILLIN AND TAZOBACTAM 25 GRAM(S): 4; .5 INJECTION, POWDER, LYOPHILIZED, FOR SOLUTION INTRAVENOUS at 05:21

## 2018-01-01 RX ADMIN — MORPHINE SULFATE 2 MILLIGRAM(S): 50 CAPSULE, EXTENDED RELEASE ORAL at 16:11

## 2018-01-01 RX ADMIN — LEVETIRACETAM 415 MILLIGRAM(S): 250 TABLET, FILM COATED ORAL at 17:10

## 2018-01-01 RX ADMIN — Medication 50 MILLIEQUIVALENT(S): at 14:25

## 2018-01-01 RX ADMIN — SODIUM CHLORIDE 60 MILLILITER(S): 9 INJECTION, SOLUTION INTRAVENOUS at 06:04

## 2018-01-01 RX ADMIN — SERTRALINE 50 MILLIGRAM(S): 25 TABLET, FILM COATED ORAL at 11:37

## 2018-01-01 RX ADMIN — Medication 15 MILLIGRAM(S): at 13:14

## 2018-01-01 RX ADMIN — ZOLPIDEM TARTRATE 5 MILLIGRAM(S): 10 TABLET ORAL at 21:59

## 2018-01-01 RX ADMIN — ONDANSETRON 4 MILLIGRAM(S): 8 TABLET, FILM COATED ORAL at 11:37

## 2018-01-01 RX ADMIN — LEVETIRACETAM 415 MILLIGRAM(S): 250 TABLET, FILM COATED ORAL at 18:34

## 2018-01-01 RX ADMIN — Medication 3 MILLILITER(S): at 08:22

## 2018-01-01 RX ADMIN — MORPHINE SULFATE 2 MILLIGRAM(S): 50 CAPSULE, EXTENDED RELEASE ORAL at 05:15

## 2018-01-01 RX ADMIN — MORPHINE SULFATE 2 MILLIGRAM(S): 50 CAPSULE, EXTENDED RELEASE ORAL at 13:32

## 2018-01-01 RX ADMIN — Medication 3 MILLILITER(S): at 19:03

## 2018-01-01 RX ADMIN — Medication 3 MILLILITER(S): at 09:04

## 2018-01-01 RX ADMIN — Medication 3 MILLILITER(S): at 02:04

## 2018-01-01 RX ADMIN — PIPERACILLIN AND TAZOBACTAM 25 GRAM(S): 4; .5 INJECTION, POWDER, LYOPHILIZED, FOR SOLUTION INTRAVENOUS at 06:08

## 2018-01-01 RX ADMIN — FENTANYL CITRATE 1 PATCH: 50 INJECTION INTRAVENOUS at 12:38

## 2018-01-01 RX ADMIN — MORPHINE SULFATE 2 MILLIGRAM(S): 50 CAPSULE, EXTENDED RELEASE ORAL at 14:47

## 2018-01-01 RX ADMIN — Medication 3 MILLILITER(S): at 07:40

## 2018-01-01 RX ADMIN — MORPHINE SULFATE 2 MILLIGRAM(S): 50 CAPSULE, EXTENDED RELEASE ORAL at 13:52

## 2018-01-01 RX ADMIN — MORPHINE SULFATE 2 MILLIGRAM(S): 50 CAPSULE, EXTENDED RELEASE ORAL at 16:38

## 2018-01-01 RX ADMIN — Medication 650 MILLIGRAM(S): at 14:29

## 2018-01-01 RX ADMIN — Medication 3 MILLILITER(S): at 01:29

## 2018-01-01 RX ADMIN — PIPERACILLIN AND TAZOBACTAM 25 GRAM(S): 4; .5 INJECTION, POWDER, LYOPHILIZED, FOR SOLUTION INTRAVENOUS at 21:35

## 2018-01-01 RX ADMIN — HEPARIN SODIUM 5000 UNIT(S): 5000 INJECTION INTRAVENOUS; SUBCUTANEOUS at 18:27

## 2018-01-01 RX ADMIN — Medication 3 MILLILITER(S): at 08:13

## 2018-01-01 RX ADMIN — LEVETIRACETAM 400 MILLIGRAM(S): 250 TABLET, FILM COATED ORAL at 05:45

## 2018-01-01 RX ADMIN — HEPARIN SODIUM 5000 UNIT(S): 5000 INJECTION INTRAVENOUS; SUBCUTANEOUS at 06:10

## 2018-01-01 RX ADMIN — MORPHINE SULFATE 2 MILLIGRAM(S): 50 CAPSULE, EXTENDED RELEASE ORAL at 09:50

## 2018-01-01 RX ADMIN — SODIUM CHLORIDE 75 MILLILITER(S): 9 INJECTION INTRAMUSCULAR; INTRAVENOUS; SUBCUTANEOUS at 03:03

## 2018-01-01 RX ADMIN — LEVETIRACETAM 415 MILLIGRAM(S): 250 TABLET, FILM COATED ORAL at 18:29

## 2018-01-01 RX ADMIN — MORPHINE SULFATE 2 MILLIGRAM(S): 50 CAPSULE, EXTENDED RELEASE ORAL at 06:16

## 2018-01-01 RX ADMIN — Medication 3 MILLILITER(S): at 01:10

## 2018-01-01 RX ADMIN — HEPARIN SODIUM 5000 UNIT(S): 5000 INJECTION INTRAVENOUS; SUBCUTANEOUS at 06:01

## 2018-01-01 RX ADMIN — HEPARIN SODIUM 5000 UNIT(S): 5000 INJECTION INTRAVENOUS; SUBCUTANEOUS at 07:02

## 2018-01-01 RX ADMIN — Medication 3 MILLILITER(S): at 08:43

## 2018-01-01 RX ADMIN — Medication 3 MILLILITER(S): at 14:38

## 2018-01-01 RX ADMIN — HEPARIN SODIUM 5000 UNIT(S): 5000 INJECTION INTRAVENOUS; SUBCUTANEOUS at 05:45

## 2018-01-01 RX ADMIN — Medication 3 MILLILITER(S): at 08:14

## 2018-01-01 RX ADMIN — HEPARIN SODIUM 5000 UNIT(S): 5000 INJECTION INTRAVENOUS; SUBCUTANEOUS at 17:36

## 2018-01-01 RX ADMIN — FENTANYL CITRATE 1 PATCH: 50 INJECTION INTRAVENOUS at 08:52

## 2018-01-01 RX ADMIN — PIPERACILLIN AND TAZOBACTAM 25 GRAM(S): 4; .5 INJECTION, POWDER, LYOPHILIZED, FOR SOLUTION INTRAVENOUS at 21:20

## 2018-01-01 RX ADMIN — Medication 3 MILLILITER(S): at 08:05

## 2018-01-01 RX ADMIN — MORPHINE SULFATE 2 MILLIGRAM(S): 50 CAPSULE, EXTENDED RELEASE ORAL at 16:58

## 2018-01-01 RX ADMIN — MORPHINE SULFATE 2 MILLIGRAM(S): 50 CAPSULE, EXTENDED RELEASE ORAL at 01:14

## 2018-01-01 RX ADMIN — PIPERACILLIN AND TAZOBACTAM 25 GRAM(S): 4; .5 INJECTION, POWDER, LYOPHILIZED, FOR SOLUTION INTRAVENOUS at 21:18

## 2018-01-01 RX ADMIN — Medication 100 MILLIGRAM(S): at 11:39

## 2018-01-01 RX ADMIN — ZOLPIDEM TARTRATE 5 MILLIGRAM(S): 10 TABLET ORAL at 23:40

## 2018-01-01 RX ADMIN — Medication 3 MILLILITER(S): at 13:57

## 2018-01-01 RX ADMIN — MORPHINE SULFATE 2 MILLIGRAM(S): 50 CAPSULE, EXTENDED RELEASE ORAL at 10:18

## 2018-01-01 RX ADMIN — Medication 3 MILLILITER(S): at 19:38

## 2018-01-01 RX ADMIN — FENTANYL CITRATE 1 PATCH: 50 INJECTION INTRAVENOUS at 11:27

## 2018-01-01 RX ADMIN — Medication 125 MILLIGRAM(S): at 04:07

## 2018-01-01 RX ADMIN — HEPARIN SODIUM 5000 UNIT(S): 5000 INJECTION INTRAVENOUS; SUBCUTANEOUS at 05:21

## 2018-01-01 RX ADMIN — ZOLPIDEM TARTRATE 5 MILLIGRAM(S): 10 TABLET ORAL at 22:32

## 2018-01-01 RX ADMIN — PIPERACILLIN AND TAZOBACTAM 25 GRAM(S): 4; .5 INJECTION, POWDER, LYOPHILIZED, FOR SOLUTION INTRAVENOUS at 07:02

## 2018-01-01 RX ADMIN — MORPHINE SULFATE 2 MILLIGRAM(S): 50 CAPSULE, EXTENDED RELEASE ORAL at 13:22

## 2018-01-01 RX ADMIN — ZOLPIDEM TARTRATE 5 MILLIGRAM(S): 10 TABLET ORAL at 23:28

## 2018-01-01 RX ADMIN — Medication 3 MILLILITER(S): at 08:34

## 2018-01-01 RX ADMIN — Medication 15 MILLIGRAM(S): at 16:54

## 2018-01-01 RX ADMIN — Medication 50 MILLIEQUIVALENT(S): at 15:45

## 2018-01-01 RX ADMIN — PIPERACILLIN AND TAZOBACTAM 200 GRAM(S): 4; .5 INJECTION, POWDER, LYOPHILIZED, FOR SOLUTION INTRAVENOUS at 20:49

## 2018-01-01 RX ADMIN — MORPHINE SULFATE 2 MILLIGRAM(S): 50 CAPSULE, EXTENDED RELEASE ORAL at 17:16

## 2018-01-01 RX ADMIN — HEPARIN SODIUM 5000 UNIT(S): 5000 INJECTION INTRAVENOUS; SUBCUTANEOUS at 05:49

## 2018-01-01 RX ADMIN — HEPARIN SODIUM 5000 UNIT(S): 5000 INJECTION INTRAVENOUS; SUBCUTANEOUS at 05:38

## 2018-01-01 RX ADMIN — MORPHINE SULFATE 2 MILLIGRAM(S): 50 CAPSULE, EXTENDED RELEASE ORAL at 13:15

## 2018-01-01 RX ADMIN — HEPARIN SODIUM 5000 UNIT(S): 5000 INJECTION INTRAVENOUS; SUBCUTANEOUS at 18:29

## 2018-01-01 RX ADMIN — Medication 3 MILLILITER(S): at 08:06

## 2018-01-01 RX ADMIN — MORPHINE SULFATE 2 MILLIGRAM(S): 50 CAPSULE, EXTENDED RELEASE ORAL at 21:02

## 2018-01-01 RX ADMIN — PIPERACILLIN AND TAZOBACTAM 25 GRAM(S): 4; .5 INJECTION, POWDER, LYOPHILIZED, FOR SOLUTION INTRAVENOUS at 05:38

## 2018-01-01 RX ADMIN — CEFEPIME 100 MILLIGRAM(S): 1 INJECTION, POWDER, FOR SOLUTION INTRAMUSCULAR; INTRAVENOUS at 05:49

## 2018-01-01 RX ADMIN — CLOPIDOGREL BISULFATE 75 MILLIGRAM(S): 75 TABLET, FILM COATED ORAL at 11:37

## 2018-01-01 RX ADMIN — ZOLPIDEM TARTRATE 5 MILLIGRAM(S): 10 TABLET ORAL at 22:12

## 2018-01-01 RX ADMIN — HEPARIN SODIUM 5000 UNIT(S): 5000 INJECTION INTRAVENOUS; SUBCUTANEOUS at 05:09

## 2018-01-01 RX ADMIN — CEFEPIME 100 MILLIGRAM(S): 1 INJECTION, POWDER, FOR SOLUTION INTRAMUSCULAR; INTRAVENOUS at 17:10

## 2018-01-01 RX ADMIN — MORPHINE SULFATE 2 MILLIGRAM(S): 50 CAPSULE, EXTENDED RELEASE ORAL at 01:34

## 2018-01-01 RX ADMIN — HEPARIN SODIUM 5000 UNIT(S): 5000 INJECTION INTRAVENOUS; SUBCUTANEOUS at 17:11

## 2018-01-01 RX ADMIN — SODIUM CHLORIDE 100 MILLILITER(S): 9 INJECTION, SOLUTION INTRAVENOUS at 13:22

## 2018-01-01 RX ADMIN — PIPERACILLIN AND TAZOBACTAM 25 GRAM(S): 4; .5 INJECTION, POWDER, LYOPHILIZED, FOR SOLUTION INTRAVENOUS at 14:56

## 2018-01-01 RX ADMIN — GABAPENTIN 300 MILLIGRAM(S): 400 CAPSULE ORAL at 05:38

## 2018-01-01 RX ADMIN — LEVETIRACETAM 415 MILLIGRAM(S): 250 TABLET, FILM COATED ORAL at 06:01

## 2018-01-01 RX ADMIN — MORPHINE SULFATE 2 MILLIGRAM(S): 50 CAPSULE, EXTENDED RELEASE ORAL at 19:54

## 2018-01-01 RX ADMIN — MORPHINE SULFATE 2 MILLIGRAM(S): 50 CAPSULE, EXTENDED RELEASE ORAL at 10:35

## 2018-01-01 RX ADMIN — LEVETIRACETAM 415 MILLIGRAM(S): 250 TABLET, FILM COATED ORAL at 18:12

## 2018-01-01 RX ADMIN — LEVETIRACETAM 400 MILLIGRAM(S): 250 TABLET, FILM COATED ORAL at 21:35

## 2018-01-01 RX ADMIN — Medication 3 MILLILITER(S): at 01:53

## 2018-01-01 RX ADMIN — MORPHINE SULFATE 2 MILLIGRAM(S): 50 CAPSULE, EXTENDED RELEASE ORAL at 12:41

## 2018-01-01 RX ADMIN — MORPHINE SULFATE 2 MILLIGRAM(S): 50 CAPSULE, EXTENDED RELEASE ORAL at 08:11

## 2018-01-01 RX ADMIN — LEVETIRACETAM 415 MILLIGRAM(S): 250 TABLET, FILM COATED ORAL at 06:08

## 2018-01-01 RX ADMIN — HEPARIN SODIUM 5000 UNIT(S): 5000 INJECTION INTRAVENOUS; SUBCUTANEOUS at 06:12

## 2018-01-01 RX ADMIN — Medication 200 MILLIGRAM(S): at 18:27

## 2018-01-01 RX ADMIN — Medication 200 MILLIGRAM(S): at 05:21

## 2018-01-01 RX ADMIN — SODIUM CHLORIDE 75 MILLILITER(S): 9 INJECTION INTRAMUSCULAR; INTRAVENOUS; SUBCUTANEOUS at 06:11

## 2018-01-01 RX ADMIN — PIPERACILLIN AND TAZOBACTAM 25 GRAM(S): 4; .5 INJECTION, POWDER, LYOPHILIZED, FOR SOLUTION INTRAVENOUS at 15:30

## 2018-01-01 RX ADMIN — MORPHINE SULFATE 2 MILLIGRAM(S): 50 CAPSULE, EXTENDED RELEASE ORAL at 08:56

## 2018-01-01 RX ADMIN — Medication 200 MILLIGRAM(S): at 05:09

## 2018-01-01 RX ADMIN — MORPHINE SULFATE 2 MILLIGRAM(S): 50 CAPSULE, EXTENDED RELEASE ORAL at 21:51

## 2018-01-01 RX ADMIN — PANTOPRAZOLE SODIUM 40 MILLIGRAM(S): 20 TABLET, DELAYED RELEASE ORAL at 05:39

## 2018-01-01 RX ADMIN — LEVETIRACETAM 415 MILLIGRAM(S): 250 TABLET, FILM COATED ORAL at 06:24

## 2018-01-01 RX ADMIN — HEPARIN SODIUM 5000 UNIT(S): 5000 INJECTION INTRAVENOUS; SUBCUTANEOUS at 17:19

## 2018-01-01 RX ADMIN — LEVETIRACETAM 400 MILLIGRAM(S): 250 TABLET, FILM COATED ORAL at 18:50

## 2018-01-01 RX ADMIN — LEVETIRACETAM 415 MILLIGRAM(S): 250 TABLET, FILM COATED ORAL at 07:27

## 2018-01-01 RX ADMIN — Medication 20 MILLIGRAM(S): at 07:01

## 2018-01-01 RX ADMIN — Medication 3 MILLILITER(S): at 08:02

## 2018-01-01 RX ADMIN — LEVETIRACETAM 415 MILLIGRAM(S): 250 TABLET, FILM COATED ORAL at 06:09

## 2018-01-01 RX ADMIN — Medication 3 MILLILITER(S): at 19:37

## 2018-01-01 RX ADMIN — MORPHINE SULFATE 2 MILLIGRAM(S): 50 CAPSULE, EXTENDED RELEASE ORAL at 05:39

## 2018-01-01 RX ADMIN — GABAPENTIN 300 MILLIGRAM(S): 400 CAPSULE ORAL at 06:41

## 2018-01-01 RX ADMIN — MORPHINE SULFATE 2 MILLIGRAM(S): 50 CAPSULE, EXTENDED RELEASE ORAL at 21:30

## 2018-01-01 RX ADMIN — Medication 3 MILLILITER(S): at 01:58

## 2018-01-01 RX ADMIN — PIPERACILLIN AND TAZOBACTAM 25 GRAM(S): 4; .5 INJECTION, POWDER, LYOPHILIZED, FOR SOLUTION INTRAVENOUS at 07:09

## 2018-01-01 RX ADMIN — PIPERACILLIN AND TAZOBACTAM 25 GRAM(S): 4; .5 INJECTION, POWDER, LYOPHILIZED, FOR SOLUTION INTRAVENOUS at 15:43

## 2018-01-01 RX ADMIN — Medication 15 MILLIGRAM(S): at 17:17

## 2018-01-01 RX ADMIN — LEVETIRACETAM 415 MILLIGRAM(S): 250 TABLET, FILM COATED ORAL at 05:21

## 2018-01-01 RX ADMIN — Medication 50 MILLIGRAM(S): at 05:38

## 2018-01-01 RX ADMIN — Medication 3 MILLILITER(S): at 08:54

## 2018-01-01 RX ADMIN — Medication 1 TABLET(S): at 11:37

## 2018-01-01 RX ADMIN — Medication 650 MILLIGRAM(S): at 07:01

## 2018-01-01 RX ADMIN — SODIUM CHLORIDE 100 MILLILITER(S): 9 INJECTION, SOLUTION INTRAVENOUS at 12:14

## 2018-01-01 RX ADMIN — SODIUM CHLORIDE 100 MILLILITER(S): 9 INJECTION, SOLUTION INTRAVENOUS at 22:39

## 2018-01-01 RX ADMIN — SODIUM CHLORIDE 75 MILLILITER(S): 9 INJECTION INTRAMUSCULAR; INTRAVENOUS; SUBCUTANEOUS at 05:39

## 2018-01-01 RX ADMIN — PIPERACILLIN AND TAZOBACTAM 25 GRAM(S): 4; .5 INJECTION, POWDER, LYOPHILIZED, FOR SOLUTION INTRAVENOUS at 21:59

## 2018-01-01 RX ADMIN — SODIUM CHLORIDE 40 MILLILITER(S): 9 INJECTION, SOLUTION INTRAVENOUS at 10:19

## 2018-01-01 RX ADMIN — Medication 200 MILLIGRAM(S): at 05:38

## 2018-01-01 RX ADMIN — MORPHINE SULFATE 2 MILLIGRAM(S): 50 CAPSULE, EXTENDED RELEASE ORAL at 16:46

## 2018-01-01 RX ADMIN — MORPHINE SULFATE 2 MILLIGRAM(S): 50 CAPSULE, EXTENDED RELEASE ORAL at 17:00

## 2018-01-01 RX ADMIN — Medication 200 MILLIGRAM(S): at 07:03

## 2018-01-01 RX ADMIN — HEPARIN SODIUM 5000 UNIT(S): 5000 INJECTION INTRAVENOUS; SUBCUTANEOUS at 18:12

## 2018-01-01 RX ADMIN — PIPERACILLIN AND TAZOBACTAM 25 GRAM(S): 4; .5 INJECTION, POWDER, LYOPHILIZED, FOR SOLUTION INTRAVENOUS at 21:25

## 2018-01-01 RX ADMIN — HEPARIN SODIUM 5000 UNIT(S): 5000 INJECTION INTRAVENOUS; SUBCUTANEOUS at 18:42

## 2018-01-01 RX ADMIN — Medication 650 MILLIGRAM(S): at 21:50

## 2018-01-01 RX ADMIN — MORPHINE SULFATE 2 MILLIGRAM(S): 50 CAPSULE, EXTENDED RELEASE ORAL at 20:54

## 2018-01-01 RX ADMIN — SODIUM CHLORIDE 75 MILLILITER(S): 9 INJECTION INTRAMUSCULAR; INTRAVENOUS; SUBCUTANEOUS at 05:48

## 2018-01-01 RX ADMIN — Medication 50 MILLIEQUIVALENT(S): at 20:57

## 2018-01-01 RX ADMIN — SODIUM CHLORIDE 75 MILLILITER(S): 9 INJECTION INTRAMUSCULAR; INTRAVENOUS; SUBCUTANEOUS at 05:22

## 2018-01-01 RX ADMIN — Medication 3 MILLILITER(S): at 14:35

## 2018-01-01 RX ADMIN — Medication 650 MILLIGRAM(S): at 05:38

## 2018-01-01 RX ADMIN — Medication 3 MILLILITER(S): at 14:05

## 2018-01-01 RX ADMIN — SODIUM CHLORIDE 60 MILLILITER(S): 9 INJECTION, SOLUTION INTRAVENOUS at 01:04

## 2018-01-01 RX ADMIN — LEVETIRACETAM 415 MILLIGRAM(S): 250 TABLET, FILM COATED ORAL at 18:42

## 2018-01-01 RX ADMIN — Medication 3 MILLILITER(S): at 19:57

## 2018-01-01 RX ADMIN — Medication 3 MILLILITER(S): at 02:47

## 2018-01-01 RX ADMIN — SODIUM CHLORIDE 500 MILLILITER(S): 9 INJECTION INTRAMUSCULAR; INTRAVENOUS; SUBCUTANEOUS at 00:11

## 2018-01-01 RX ADMIN — HEPARIN SODIUM 5000 UNIT(S): 5000 INJECTION INTRAVENOUS; SUBCUTANEOUS at 18:09

## 2018-01-01 RX ADMIN — LEVETIRACETAM 415 MILLIGRAM(S): 250 TABLET, FILM COATED ORAL at 18:28

## 2018-01-01 RX ADMIN — MORPHINE SULFATE 2 MILLIGRAM(S): 50 CAPSULE, EXTENDED RELEASE ORAL at 08:31

## 2018-01-01 RX ADMIN — FENTANYL CITRATE 1 PATCH: 50 INJECTION INTRAVENOUS at 11:19

## 2018-01-01 RX ADMIN — MORPHINE SULFATE 2 MG/HR: 50 CAPSULE, EXTENDED RELEASE ORAL at 09:09

## 2018-01-01 RX ADMIN — MORPHINE SULFATE 2 MILLIGRAM(S): 50 CAPSULE, EXTENDED RELEASE ORAL at 16:44

## 2018-01-01 RX ADMIN — Medication 50 MILLIEQUIVALENT(S): at 11:30

## 2018-01-01 RX ADMIN — Medication 50 MILLIGRAM(S): at 21:31

## 2018-01-01 RX ADMIN — ONDANSETRON 4 MILLIGRAM(S): 8 TABLET, FILM COATED ORAL at 21:04

## 2018-01-01 RX ADMIN — MORPHINE SULFATE 2 MILLIGRAM(S): 50 CAPSULE, EXTENDED RELEASE ORAL at 08:46

## 2018-01-01 RX ADMIN — PANTOPRAZOLE SODIUM 40 MILLIGRAM(S): 20 TABLET, DELAYED RELEASE ORAL at 06:42

## 2018-01-01 RX ADMIN — PIPERACILLIN AND TAZOBACTAM 25 GRAM(S): 4; .5 INJECTION, POWDER, LYOPHILIZED, FOR SOLUTION INTRAVENOUS at 05:45

## 2018-01-01 RX ADMIN — LEVETIRACETAM 400 MILLIGRAM(S): 250 TABLET, FILM COATED ORAL at 11:40

## 2018-01-01 RX ADMIN — SODIUM CHLORIDE 100 MILLILITER(S): 9 INJECTION INTRAMUSCULAR; INTRAVENOUS; SUBCUTANEOUS at 22:00

## 2018-01-01 RX ADMIN — DIATRIZOATE MEGLUMINE 20 MILLILITER(S): 180 INJECTION, SOLUTION INTRAVESICAL at 04:06

## 2018-01-01 RX ADMIN — Medication 15 MILLIGRAM(S): at 14:29

## 2018-01-01 RX ADMIN — PIPERACILLIN AND TAZOBACTAM 25 GRAM(S): 4; .5 INJECTION, POWDER, LYOPHILIZED, FOR SOLUTION INTRAVENOUS at 14:26

## 2018-01-01 RX ADMIN — MORPHINE SULFATE 2 MG/HR: 50 CAPSULE, EXTENDED RELEASE ORAL at 10:24

## 2018-01-01 RX ADMIN — Medication 650 MILLIGRAM(S): at 06:15

## 2018-01-01 RX ADMIN — LEVETIRACETAM 415 MILLIGRAM(S): 250 TABLET, FILM COATED ORAL at 18:09

## 2018-01-01 RX ADMIN — SODIUM CHLORIDE 3 MILLILITER(S): 9 INJECTION INTRAMUSCULAR; INTRAVENOUS; SUBCUTANEOUS at 13:55

## 2018-01-01 RX ADMIN — SODIUM CHLORIDE 100 MILLILITER(S): 9 INJECTION, SOLUTION INTRAVENOUS at 06:29

## 2018-01-01 RX ADMIN — TEMAZEPAM 15 MILLIGRAM(S): 15 CAPSULE ORAL at 21:20

## 2018-01-01 RX ADMIN — MORPHINE SULFATE 2 MILLIGRAM(S): 50 CAPSULE, EXTENDED RELEASE ORAL at 00:44

## 2018-01-01 RX ADMIN — Medication 3 MILLILITER(S): at 14:09

## 2018-01-01 RX ADMIN — LEVETIRACETAM 415 MILLIGRAM(S): 250 TABLET, FILM COATED ORAL at 17:35

## 2018-01-01 RX ADMIN — MORPHINE SULFATE 2 MILLIGRAM(S): 50 CAPSULE, EXTENDED RELEASE ORAL at 13:37

## 2018-01-01 RX ADMIN — MORPHINE SULFATE 2 MILLIGRAM(S): 50 CAPSULE, EXTENDED RELEASE ORAL at 06:39

## 2018-01-01 RX ADMIN — Medication 50 MILLIEQUIVALENT(S): at 22:32

## 2018-05-07 NOTE — ED PROVIDER NOTE - CRITICAL CARE PROVIDED
interpretation of diagnostic studies/telephone consultation with the patient's family/documentation/additional history taking/direct patient care (not related to procedure)/consultation with other physicians

## 2018-05-07 NOTE — ED ADULT NURSE NOTE - PMH
Anemia    Colitis    COPD (chronic obstructive pulmonary disease)    Depression    High cholesterol    Hypertension    Osteoporosis

## 2018-05-07 NOTE — CONSULT NOTE ADULT - SUBJECTIVE AND OBJECTIVE BOX
Patient is a 82y old  Female who presents with a chief complaint of abd. pain, fever,     HPI:  pt's an elderly white female nursing home resident, who presents to er w/ abdominal distension, fever ,(103), possible aspiration pneumonia. pt is a poor medical historian, Therefore information is from pt's son and nursing home transfer, pt has had worsening abdominal distension x >2 months pt refused further work up (in fear of carcinogenic etiology) In addition pt had signed a DNR, and do not hospitalize order in regards to her care. Now pt was brought to er from nursing home for 2.5 days history of persistent fever, worsening abd pain w/ possible aspiration pneumonia.  W/U in er shows pt to have LLL infiltration, elevated left hemidiaphram, ct abd/pelvis- dilated loops of small and large bowels suspicious for intestinal obstruction. w/ a leukocytosis. and T max 103.    PAST MEDICAL & SURGICAL HISTORY:  Osteoporosis  COPD (chronic obstructive pulmonary disease)  High cholesterol  Colitis  Depression  Hypertension  Anemia    Allergies    Bactrim (Unknown)  sulfa drugs (Unknown)  tetanus immune globulin (Unknown)    Intolerances      FAMILY HISTORY:    Home Medications:  Acidophilus Probiotic Blend oral capsule: 1 cap(s) orally once a day (07 May 2018 13:03)  carBAMazepine 200 mg oral tablet:  (07 May 2018 13:03)  DuoNeb 0.5 mg-2.5 mg/3 mL inhalation solution: 3 milliliter(s) inhaled 4 times a day (07 May 2018 13:03)  fentaNYL 75 mcg/hr transdermal film, extended release: 1 patch transdermal every 72 hours (07 May 2018 13:03)  gabapentin 300 mg oral capsule:  (07 May 2018 13:03)  hydrALAZINE 25 mg oral tablet:  (07 May 2018 13:03)  Lasix 20 mg oral tablet: 1 tab(s) orally once a day (07 May 2018 13:03)  Metoprolol Tartrate 50 mg oral tablet: 1 tab(s) orally 2 times a day (07 May 2018 13:03)  Mylanta oral suspension:  (07 May 2018 13:03)  Plavix 75 mg oral tablet: 1 tab(s) orally once a day (07 May 2018 13:03)  Protonix 40 mg oral delayed release tablet: 1 tab(s) orally once a day (07 May 2018 13:03)  Pulmicort Respules 0.5 mg/2 mL inhalation suspension:  (07 May 2018 13:03)  sertraline 25 mg oral tablet: 1 tab(s) orally once a day (07 May 2018 13:03)  Singulair 10 mg oral tablet: 1 tab(s) orally once a day (07 May 2018 13:03)  Zantac 150 oral tablet:  (07 May 2018 13:03)  ZyrTEC 10 mg oral tablet: 1 tab(s) orally once a day (07 May 2018 13:03)    Occupation:  Alochol: Denied  Smoking: Denied  Drug Use: Denied  Marital Status:         ROS: as in HPI; All other systems reviewed are negative    ICU Vital Signs Last 24 Hrs  T(C): 37.9 (07 May 2018 19:29), Max: 37.9 (07 May 2018 19:29)  T(F): 100.3 (07 May 2018 19:29), Max: 100.3 (07 May 2018 19:29)  HR: 112 (07 May 2018 21:04) (103 - 116)  BP: 107/59 (07 May 2018 21:04) (92/62 - 131/60)  BP(mean): --  ABP: --  ABP(mean): --  RR: 20 (07 May 2018 21:04) (20 - 21)  SpO2: 95% (07 May 2018 21:04) (94% - 96%)        Physical Examination:    General: elderly white female pt's alert oriented to self and place only pt's a poor medical historian . pt  No acute  respiratory distress.      HEENT: Pupils equal, reactive to light.  Symmetric.    PULM: coarse breath sounds via auscultation bilaterally, no significant sputum production    CVS: tachycardic to 120b/m , no murmurs, rubs, or gallops    ABD: tense distended, tender LLQ , hypo active bowel sounds,     EXT: No edema, nontender    SKIN: pale w/ cool lower             LABS:                        11.3   28.75 )-----------( 677      ( 07 May 2018 13:59 )             36.4     07 May 2018 13:59    145    |  91     |  17     ----------------------------<  130    3.2     |  30     |  0.7      Ca    8.9        07 May 2018 13:59    TPro  5.6    /  Alb  2.9    /  TBili  <0.2   /  DBili  x      /  AST  12     /  ALT  <5     /  AlkPhos  108    07 May 2018 13:59  Amylase x     lipase 18             CAPILLARY BLOOD GLUCOSE        PT/INR - ( 07 May 2018 13:59 )   PT: 12.80 sec;   INR: 1.18 ratio         PTT - ( 07 May 2018 13:59 )  PTT:21.2 sec  Urinalysis Basic - ( 07 May 2018 15:00 )    Color: Yellow / Appearance: Slightly Cloudy / S.025 / pH: x  Gluc: x / Ketone: Trace  / Bili: Small / Urobili: 0.2 mg/dL   Blood: x / Protein: 30 mg/dL / Nitrite: Negative   Leuk Esterase: Trace / RBC: 5-10 /HPF / WBC 6-10 /HPF   Sq Epi: x / Non Sq Epi: Occasional /HPF / Bacteria: TNTC /HPF      Culture    Lactate, Blood: 1.9 mmol/L (18 @ 13:59)      MEDICATIONS  (STANDING):  sodium chloride 0.9%. 1000 milliLiter(s) (125 mL/Hr) IV Continuous <Continuous>    MEDICATIONS  (PRN):        RADIOLOGY: ***     CXR: elevated left hemidiaphram, LLL hazziness   CT abd/pelvis:       ECHO:      IMPRESSION:        PLAN:    CNS:    HEENT:    PULMONARY:    CARDIOVASCULAR:    GI: GI prophylaxis.  Feeding     RENAL:    INFECTIOUS DISEASE:    HEMATOLOGICAL:  DVT prophylaxis.    ENDOCRINE:  Follow up FS.  Insulin protocol if needed.    MUSCULOSKELETAL:        CRITICAL CARE TIME SPENT: *** Patient is a 82y old  Female who presents with a chief complaint of abd. pain, fever,     HPI:  pt's an elderly white female nursing home resident, who presents to er w/ abdominal distension, fever ,(103), possible aspiration pneumonia. pt is a poor medical historian, Therefore information is from pt's son and nursing home transfer, pt has had worsening abdominal distension x >2 months pt refused further work up (in fear of carcinogenic etiology) In addition pt had signed a DNR, and do not hospitalize order in regards to her care. Now pt was brought to er from nursing home for 2.5 days history of persistent fever, worsening abd pain w/ possible aspiration pneumonia.  W/U in er shows pt to have LLL infiltration, elevated left hemidiaphram, ct abd/pelvis- dilated loops of small and large bowels suspicious for intestinal obstruction. w/ a leukocytosis. and T max 103.    PAST MEDICAL & SURGICAL HISTORY:  Osteoporosis  COPD (chronic obstructive pulmonary disease)  High cholesterol  Colitis  Depression  Hypertension  Anemia    Allergies    Bactrim (Unknown)  sulfa drugs (Unknown)  tetanus immune globulin (Unknown)    Intolerances      FAMILY HISTORY:    Home Medications:  Acidophilus Probiotic Blend oral capsule: 1 cap(s) orally once a day (07 May 2018 13:03)  carBAMazepine 200 mg oral tablet:  (07 May 2018 13:03)  DuoNeb 0.5 mg-2.5 mg/3 mL inhalation solution: 3 milliliter(s) inhaled 4 times a day (07 May 2018 13:03)  fentaNYL 75 mcg/hr transdermal film, extended release: 1 patch transdermal every 72 hours (07 May 2018 13:03)  gabapentin 300 mg oral capsule:  (07 May 2018 13:03)  hydrALAZINE 25 mg oral tablet:  (07 May 2018 13:03)  Lasix 20 mg oral tablet: 1 tab(s) orally once a day (07 May 2018 13:03)  Metoprolol Tartrate 50 mg oral tablet: 1 tab(s) orally 2 times a day (07 May 2018 13:03)  Mylanta oral suspension:  (07 May 2018 13:03)  Plavix 75 mg oral tablet: 1 tab(s) orally once a day (07 May 2018 13:03)  Protonix 40 mg oral delayed release tablet: 1 tab(s) orally once a day (07 May 2018 13:03)  Pulmicort Respules 0.5 mg/2 mL inhalation suspension:  (07 May 2018 13:03)  sertraline 25 mg oral tablet: 1 tab(s) orally once a day (07 May 2018 13:03)  Singulair 10 mg oral tablet: 1 tab(s) orally once a day (07 May 2018 13:03)  Zantac 150 oral tablet:  (07 May 2018 13:03)  ZyrTEC 10 mg oral tablet: 1 tab(s) orally once a day (07 May 2018 13:03)    Occupation:  Alochol: Denied  Smoking: Denied  Drug Use: Denied  Marital Status:         ROS: as in HPI; All other systems reviewed are negative    ICU Vital Signs Last 24 Hrs  T(C): 37.9 (07 May 2018 19:29), Max: 37.9 (07 May 2018 19:29)  T(F): 100.3 (07 May 2018 19:29), Max: 100.3 (07 May 2018 19:29)  HR: 112 (07 May 2018 21:04) (103 - 116)  BP: 107/59 (07 May 2018 21:04) (92/62 - 131/60)  BP(mean): --  ABP: --  ABP(mean): --  RR: 20 (07 May 2018 21:04) (20 - 21)  SpO2: 95% (07 May 2018 21:04) (94% - 96%)        Physical Examination:    General: elderly white female pt's alert oriented to self and place only pt's a poor medical historian . pt  No acute  respiratory distress.      HEENT: Pupils equal, reactive to light.  Symmetric.    PULM: coarse breath sounds via auscultation bilaterally, no significant sputum production    CVS: tachycardic to 120b/m , no murmurs, rubs, or gallops    ABD: tense distended, tender LLQ , hypo active bowel sounds,     EXT: No edema, nontender    SKIN: pale w/ cool lower             LABS:                        11.3   28.75 )-----------( 677      ( 07 May 2018 13:59 )             36.4     07 May 2018 13:59    145    |  91     |  17     ----------------------------<  130    3.2     |  30     |  0.7      Ca    8.9        07 May 2018 13:59    TPro  5.6    /  Alb  2.9    /  TBili  <0.2   /  DBili  x      /  AST  12     /  ALT  <5     /  AlkPhos  108    07 May 2018 13:59  Amylase x     lipase 18             CAPILLARY BLOOD GLUCOSE        PT/INR - ( 07 May 2018 13:59 )   PT: 12.80 sec;   INR: 1.18 ratio         PTT - ( 07 May 2018 13:59 )  PTT:21.2 sec  Urinalysis Basic - ( 07 May 2018 15:00 )    Color: Yellow / Appearance: Slightly Cloudy / S.025 / pH: x  Gluc: x / Ketone: Trace  / Bili: Small / Urobili: 0.2 mg/dL   Blood: x / Protein: 30 mg/dL / Nitrite: Negative   Leuk Esterase: Trace / RBC: 5-10 /HPF / WBC 6-10 /HPF   Sq Epi: x / Non Sq Epi: Occasional /HPF / Bacteria: TNTC /HPF      Culture    Lactate, Blood: 1.9 mmol/L (18 @ 13:59)      MEDICATIONS  (STANDING):  sodium chloride 0.9%. 1000 milliLiter(s) (125 mL/Hr) IV Continuous <Continuous>    MEDICATIONS  (PRN):        RADIOLOGY: ***     CXR: elevated left hemidiaphram, LLL hazziness   CT abd/pelvis: diffuse dilation of large and small bowels, endoscopic evaluation advised to r/o- obstructive  process.           IMPRESSION:  -leukocytosis r/o- pneumonia,   -abdominal distension, r/o obstruction (large and small bowel, malignancy)            PLAN:    CNS:    HEENT:    PULMONARY: IV antibx, pulm. consult    CARDIOVASCULAR:    GI: npo, GI consult/surgery consult. decompress GI ,     RENAL:    INFECTIOUS DISEASE: ID consult, IV rocephin, zithro    HEMATOLOGICAL:  DVT prophylaxis.    ENDOCRINE:  Follow up FS.  Insulin protocol if needed.    MUSCULOSKELETAL:        CRITICAL CARE TIME SPENT: *** Patient is a 82y old  Female who presents with a chief complaint of abd. pain, fever,     HPI:  pt's an elderly white female nursing home resident, who presents to er w/ abdominal distension, fever ,(103), possible aspiration pneumonia. pt is a poor medical historian, Therefore information is from pt's son and nursing home transfer, pt has had worsening abdominal distension x >2 months pt refused further work up (in fear of carcinogenic etiology) In addition pt had signed a DNR, and do not hospitalize order in regards to her care. Now pt was brought to er from nursing home for 2.5 days history of persistent fever, worsening abd pain w/ possible aspiration pneumonia.  W/U in er shows pt to have LLL infiltration, elevated left hemidiaphram, ct abd/pelvis- dilated loops of small and large bowels suspicious for intestinal obstruction. w/ a leukocytosis. and T max 103.    PAST MEDICAL & SURGICAL HISTORY:  Osteoporosis  COPD (chronic obstructive pulmonary disease)  High cholesterol  Colitis  Depression  Hypertension  Anemia    Allergies    Bactrim (Unknown)  sulfa drugs (Unknown)  tetanus immune globulin (Unknown)    Intolerances      FAMILY HISTORY:    Home Medications:  Acidophilus Probiotic Blend oral capsule: 1 cap(s) orally once a day (07 May 2018 13:03)  carBAMazepine 200 mg oral tablet:  (07 May 2018 13:03)  DuoNeb 0.5 mg-2.5 mg/3 mL inhalation solution: 3 milliliter(s) inhaled 4 times a day (07 May 2018 13:03)  fentaNYL 75 mcg/hr transdermal film, extended release: 1 patch transdermal every 72 hours (07 May 2018 13:03)  gabapentin 300 mg oral capsule:  (07 May 2018 13:03)  hydrALAZINE 25 mg oral tablet:  (07 May 2018 13:03)  Lasix 20 mg oral tablet: 1 tab(s) orally once a day (07 May 2018 13:03)  Metoprolol Tartrate 50 mg oral tablet: 1 tab(s) orally 2 times a day (07 May 2018 13:03)  Mylanta oral suspension:  (07 May 2018 13:03)  Plavix 75 mg oral tablet: 1 tab(s) orally once a day (07 May 2018 13:03)  Protonix 40 mg oral delayed release tablet: 1 tab(s) orally once a day (07 May 2018 13:03)  Pulmicort Respules 0.5 mg/2 mL inhalation suspension:  (07 May 2018 13:03)  sertraline 25 mg oral tablet: 1 tab(s) orally once a day (07 May 2018 13:03)  Singulair 10 mg oral tablet: 1 tab(s) orally once a day (07 May 2018 13:03)  Zantac 150 oral tablet:  (07 May 2018 13:03)  ZyrTEC 10 mg oral tablet: 1 tab(s) orally once a day (07 May 2018 13:03)    Occupation:  Alochol: Denied  Smoking: Denied  Drug Use: Denied  Marital Status:         ROS: as in HPI; All other systems reviewed are negative    ICU Vital Signs Last 24 Hrs  T(C): 37.9 (07 May 2018 19:29), Max: 37.9 (07 May 2018 19:29)  T(F): 100.3 (07 May 2018 19:29), Max: 100.3 (07 May 2018 19:29)  HR: 112 (07 May 2018 21:04) (103 - 116)  BP: 107/59 (07 May 2018 21:04) (92/62 - 131/60)  BP(mean): --  ABP: --  ABP(mean): --  RR: 20 (07 May 2018 21:04) (20 - 21)  SpO2: 95% (07 May 2018 21:04) (94% - 96%)        Physical Examination:    General: elderly white female pt's alert oriented to self and place only pt's a poor medical historian . pt  No acute  respiratory distress.      HEENT: Pupils equal, reactive to light.  Symmetric.    PULM: coarse breath sounds via auscultation bilaterally, no significant sputum production    CVS: tachycardic to 120b/m , no murmurs, rubs, or gallops    ABD: tense distended, tender LLQ , hypo active bowel sounds,     EXT: No edema, nontender    SKIN: pale w/ cool lower             LABS:                        11.3   28.75 )-----------( 677      ( 07 May 2018 13:59 )             36.4     07 May 2018 13:59    145    |  91     |  17     ----------------------------<  130    3.2     |  30     |  0.7      Ca    8.9        07 May 2018 13:59    TPro  5.6    /  Alb  2.9    /  TBili  <0.2   /  DBili  x      /  AST  12     /  ALT  <5     /  AlkPhos  108    07 May 2018 13:59  Amylase x     lipase 18             CAPILLARY BLOOD GLUCOSE        PT/INR - ( 07 May 2018 13:59 )   PT: 12.80 sec;   INR: 1.18 ratio         PTT - ( 07 May 2018 13:59 )  PTT:21.2 sec  Urinalysis Basic - ( 07 May 2018 15:00 )    Color: Yellow / Appearance: Slightly Cloudy / S.025 / pH: x  Gluc: x / Ketone: Trace  / Bili: Small / Urobili: 0.2 mg/dL   Blood: x / Protein: 30 mg/dL / Nitrite: Negative   Leuk Esterase: Trace / RBC: 5-10 /HPF / WBC 6-10 /HPF   Sq Epi: x / Non Sq Epi: Occasional /HPF / Bacteria: TNTC /HPF      Culture    Lactate, Blood: 1.9 mmol/L (18 @ 13:59)      MEDICATIONS  (STANDING):  sodium chloride 0.9%. 1000 milliLiter(s) (125 mL/Hr) IV Continuous <Continuous>    MEDICATIONS  (PRN):        RADIOLOGY: ***     CXR: elevated left hemidiaphram, LLL hazziness   CT abd/pelvis: diffuse dilation of large and small bowels, endoscopic evaluation advised to r/o- obstructive  process.           IMPRESSION:  - SEE ASSESSMENT BELOW.              PLAN: See recommendations, below.    CNS:    HEENT:    PULMONARY: IV antibx, pulm. consult    CARDIOVASCULAR:    GI: npo, GI consult/surgery consult. decompress GI ,     RENAL:    INFECTIOUS DISEASE: ID consult, IV rocephin, zithro    HEMATOLOGICAL:  DVT prophylaxis.    ENDOCRINE:  Follow up FS.  Insulin protocol if needed.    MUSCULOSKELETAL:        CRITICAL CARE TIME SPENT: ***

## 2018-05-07 NOTE — ED PROCEDURE NOTE - CPROC ED INFUS LINE DETAIL1
The catheter was placed using sterile technique./The location was identified, and the area was draped and prepped.

## 2018-05-07 NOTE — ED PROVIDER NOTE - OBJECTIVE STATEMENT
The patient cannot provide any reliable history at this time all history is taken from her son. He notes that she has been complaining of abdominal pain for months but he could not get her to be evaluated.  He noted worsening confusion with fever and brought her in to the ED for evaluation.  The patient was found to be hypoxic and confused.    Patient is an 83 yo female who presents with increasing confusion, fever and abdominal pain, the abdominal pain has been present for months.

## 2018-05-07 NOTE — CONSULT NOTE ADULT - ASSESSMENT
1. Pneumonia , Left lung. Treatment given for the last few weeks.  2. Sepsis with leukocytosis , mainly due to abdominal pathology.   R/O Intestinal obstruction.   R/O Mass  Colon.  3. R/O Mass liver / right Kidney.  4. UTI.  5. DNR / DNI.  6. COPD.    Recommendations -  1. IV Fluids at 100 ml / hr.  2. NPO , except may try medication through NGT.  3. NGT for suction.  4. IV Antibiotics for Pneumonia( subacute) , UTI and intestinal pathology.  5. Surgical consult.  6. G I consult.  7. Family will decide in am if they want aggressive treatment / work up.  8. Admit on the floor and continue DNR / DNI.

## 2018-05-07 NOTE — H&P ADULT - NSHPLABSRESULTS_GEN_ALL_CORE
11.3   28.75 )-----------( 677      ( 07 May 2018 13:59 )             36.4           145  |  91<L>  |  17  ----------------------------<  130<H>  3.2<L>   |  30  |  0.7    Ca    8.9      07 May 2018 13:59    TPro  5.6<L>  /  Alb  2.9<L>  /  TBili  <0.2  /  DBili  x   /  AST  12  /  ALT  <5  /  AlkPhos  108                Urinalysis Basic - ( 07 May 2018 15:00 )    Color: Yellow / Appearance: Slightly Cloudy / S.025 / pH: x  Gluc: x / Ketone: Trace  / Bili: Small / Urobili: 0.2 mg/dL   Blood: x / Protein: 30 mg/dL / Nitrite: Negative   Leuk Esterase: Trace / RBC: 5-10 /HPF / WBC 6-10 /HPF   Sq Epi: x / Non Sq Epi: Occasional /HPF / Bacteria: TNTC /HPF        PT/INR - ( 07 May 2018 13:59 )   PT: 12.80 sec;   INR: 1.18 ratio         PTT - ( 07 May 2018 13:59 )  PTT:21.2 sec    Lactate Trend   @ 13:59 Lactate:1.9             CAPILLARY BLOOD GLUCOSE

## 2018-05-07 NOTE — ED PROVIDER NOTE - MEDICAL DECISION MAKING DETAILS
Chart finished. based on her presentation I obtained labs, xray and ct scan of the abdomen she was given IV fluids, Iv anitibiotics for a pneumonia, based on her hypoxia and fever the I feel that this is the most emergent issue likely aspiration pneumonia. In addition I suspect that she may have obstruction.    I will consult ICU at this time

## 2018-05-07 NOTE — H&P ADULT - NSHPPHYSICALEXAM_GEN_ALL_CORE
Vital Signs Last 24 Hrs  T(C): 37.9 (05-07-18 @ 19:29)  T(F): 100.3 (05-07-18 @ 19:29), Max: 100.3 (05-07-18 @ 19:29)  HR: 108 (05-07-18 @ 22:39) (103 - 116)  BP: 108/62 (05-07-18 @ 22:39)  BP(mean): --  RR: 20 (05-07-18 @ 22:39) (20 - 21)  SpO2: 96% (05-07-18 @ 22:39) (94% - 96%)  Wt(kg): --

## 2018-05-08 NOTE — CONSULT NOTE ADULT - SUBJECTIVE AND OBJECTIVE BOX
Chief Complaint: Patient is a 82y old  Female who presents with a chief complaint of  abdominal distention r/o colon mass.  The pt is obtunded so hx from her is not available.  Put a call  into her sone Yvon and am awaiting a return call.  Spoke to son ED who says pt was able to converse on the phone was complaining of long h/o abdominal complaints and ? colitis .  Spoke to [facility] pt was responsive yesterday.  Abdom distenntion started seveeral weeksd asgo - X ray studies and sono reportedly negative.  No definitive diagnosis was ever made    HPI: 83 yo female from [facility] admitted yesterday because of the above with SOB    Medications:  acetaminophen   Tablet. 650 milliGRAM(s) Oral every 6 hours PRN  ALBUTerol/ipratropium for Nebulization 3 milliLiter(s) Nebulizer every 6 hours  carBAMazepine 200 milliGRAM(s) Oral two times a day  clopidogrel Tablet 75 milliGRAM(s) Oral daily  docusate sodium 100 milliGRAM(s) Oral daily  fentaNYL   Patch  75 MICROgram(s)/Hr 1 Patch Transdermal every 72 hours  gabapentin Oral Tab/Cap - Peds 300 milliGRAM(s) Oral two times a day  heparin  Injectable 5000 Unit(s) SubCutaneous every 12 hours  hydrALAZINE 25 milliGRAM(s) Oral every 12 hours  lactobacillus acidophilus 1 Tablet(s) Oral daily  metoprolol tartrate 50 milliGRAM(s) Oral two times a day  montelukast 10 milliGRAM(s) Oral daily  pantoprazole    Tablet 40 milliGRAM(s) Oral before breakfast  piperacillin/tazobactam IVPB. 3.375 Gram(s) IV Intermittent every 8 hours  potassium chloride  20 mEq/100 mL IVPB 20 milliEquivalent(s) IV Intermittent once  ranitidine  Oral Tab/Cap - Peds 150 milliGRAM(s) Oral two times a day  sertraline 50 milliGRAM(s) Oral daily  sodium chloride 0.9%. 1000 milliLiter(s) IV Continuous <Continuous>      PMHX/PSHX:  Osteoporosis  COPD (chronic obstructive pulmonary disease) h/o pneumonia  High cholesterol  Colitis  Depression  Hypertension  Anemia  seizure disorder      Family history:  n/a    Social History: n/a    Allergies:  Bactrim (Unknown)  sulfa drugs (Unknown)  tetanus immune globulin (Unknown)        Review of Systems:  General:  No wt loss, fevers, chills, night sweats, fatigue or pruritis.  Eyes:  Good vision, no reported pain or redness.  ENT:  No sore throat, pain, runny nose, or difficulty swallowing  CV:  No pain, palpitations, hypo/hypertension  Resp:  No dyspnea, cough, tachypnea, wheezing  GI:  No pain, nausea, vomiting, dysphagia, heartburn, diarrhea, constipation, or weight loss. , No rectal bleeding, tarry stools, or hematemesis.  :  No pain, bleeding/discharges, incontinence, nocturia  Musculoskeletal:  No pain, weakness or fasciculations.  Neuro:  No weakness, tingling, memory problems or paresthesias  Psych:  No fatigue, insomnia, mood problems, depression  Endocrine:  No polyuria, polydipsia, cold/heat intolerance  Heme:  No petechiae, ecchymosis, easy bruisability  Skin:  No rash, pruritis, tattoos, scars, or edema      PHYSICAL EXAM:   Vital Signs:  Vital Signs Last 24 Hrs  T(C): 37 (08 May 2018 06:05), Max: 37.9 (07 May 2018 19:29)  T(F): 98.6 (08 May 2018 06:05), Max: 100.3 (07 May 2018 19:29)  HR: 104 (08 May 2018 06:05) (103 - 113)  BP: 110/54 (08 May 2018 06:05) (92/62 - 131/60)  BP(mean): --  RR: 18 (08 May 2018 06:05) (18 - 21)  SpO2: 95% (08 May 2018 13:03) (93% - 98%)  Daily     Daily     T(C): 37 (05-08-18 @ 06:05), Max: 37.9 (05-07-18 @ 19:29)  HR: 104 (05-08-18 @ 06:05) (103 - 113)  BP: 110/54 (05-08-18 @ 06:05) (92/62 - 131/60)  RR: 18 (05-08-18 @ 06:05) (18 - 21)  SpO2: 95% (05-08-18 @ 13:03) (93% - 98%)    GENERAL:  Appears stated age, well-groomed, well-nourished, no distress  HEENT:  Conjunctivae clear and pink, no thyromegaly, nodules, adenopathy, no JVD, sclera -anicteric  CHEST:  Full & symmetric excursion, no increased effort, breath sounds clear  HEART:  Regular rhythm, S1, S2, no murmur/rub/S3/S4, no abdominal bruit, no edema  ABDOMEN:  Firm, non-tender, markedly distended, hyperactive  bowel sounds,  no masses ,no hepato-splenomegaly, no signs of chronic liver disease  EXTEREMITIES:  no cyanosis,clubbing or edema  SKIN:  No rash/erythema/ecchymoses/petechiae/wounds/abscess/warm/dry  NEURO:  Alert, oriented, no asterixis, no tremor, no encephalopathy    LABS:                        9.6    23.12 )-----------( 614      ( 08 May 2018 08:12 )             31.4     05-08    143  |  95<L>  |  19  ----------------------------<  106<H>  4.1   |  22  |  0.6<L>    Ca    8.5      08 May 2018 08:12    TPro  5.6<L>  /  Alb  2.9<L>  /  TBili  <0.2  /  DBili  x   /  AST  12  /  ALT  <5  /  AlkPhos  108  05-07    LIVER FUNCTIONS - ( 07 May 2018 13:59 )  Alb: 2.9 g/dL / Pro: 5.6 g/dL / ALK PHOS: 108 U/L / ALT: <5 U/L / AST: 12 U/L / GGT: x           PT/INR - ( 07 May 2018 13:59 )   PT: 12.80 sec;   INR: 1.18 ratio         PTT - ( 07 May 2018 13:59 )  PTT:21.2 sec    Amylase Serum--      Lipase serum18       Ammonia--    GGT--        Imaging: CT noted      Assessment and Plan:

## 2018-05-08 NOTE — CONSULT NOTE ADULT - ASSESSMENT
SBO / LBO    r/o Colonic mass    - ngt placed to LWCS  - NPO  - serial abd exams with OBS  - d/w dr noe

## 2018-05-08 NOTE — CONSULT NOTE ADULT - ASSESSMENT
R/O Probable large bowel obstruction ?  Sepsis ? etiology  Discussed with son on how agressive w/u an Rx - deferred to brother Conrado    Rec. Stat Surgical consult  NG tube to low Goo   Repeat abdo x-ray 6 hours after NG tube  Urine C+S  Chest Xray  Blood cultures x3  ID consult  D/C narcotic analgesicsa

## 2018-05-08 NOTE — CONSULT NOTE ADULT - SUBJECTIVE AND OBJECTIVE BOX
83 YO elderly white female nursing home resident, who presents to er w/ abdominal distension, fever ,(103), possible aspiration pneumonia. pt is a poor medical historian, Therefore information is from pt's son and nursing home transfer, pt has had worsening abdominal distension x >2 months pt refused further work up (in fear of carcinogenic etiology) In addition pt had signed a DNR..     Now pt was brought to er from nursing home for 2.5 days history of persistent fever, worsening abd pain w/ possible aspiration pneumonia.  W/U in er shows pt to have LLL infiltration, elevated left hemidiaphram, ct abd/pelvis- dilated loops of small and large bowels suspicious for intestinal obstruction. w/ a leukocytosis. and T max 103.    Seen by GI and recc NGT  last colonoscopy was more then 10 years ago    Home Medications:  Acidophilus Probiotic Blend oral capsule: 1 cap(s) orally once a day (07 May 2018 13:03)  carBAMazepine 200 mg oral tablet:  (07 May 2018 13:03)  DuoNeb 0.5 mg-2.5 mg/3 mL inhalation solution: 3 milliliter(s) inhaled 4 times a day (07 May 2018 13:03)  fentaNYL 75 mcg/hr transdermal film, extended release: 1 patch transdermal every 72 hours (07 May 2018 13:03)  gabapentin 300 mg oral capsule:  (07 May 2018 13:03)  hydrALAZINE 25 mg oral tablet:  (07 May 2018 13:03)  Metoprolol Tartrate 50 mg oral tablet: 1 tab(s) orally 2 times a day (07 May 2018 13:03)  Mylanta oral suspension:  (07 May 2018 13:03)  Plavix 75 mg oral tablet: 1 tab(s) orally once a day (07 May 2018 13:03)  Protonix 40 mg oral delayed release tablet: 1 tab(s) orally once a day (07 May 2018 13:03)  Pulmicort Respules 0.5 mg/2 mL inhalation suspension:  (07 May 2018 13:03)  sertraline 25 mg oral tablet: 1 tab(s) orally once a day (07 May 2018 13:03)  Singulair 10 mg oral tablet: 1 tab(s) orally once a day (07 May 2018 13:03)  Zantac 150 oral tablet:  (07 May 2018 13:03)  ZyrTEC 10 mg oral tablet: 1 tab(s) orally once a day (07 May 2018 13:03)    PAST MEDICAL & SURGICAL HISTORY:  Osteoporosis  COPD (chronic obstructive pulmonary disease)  High cholesterol  Colitis  Depression  Hypertension  Anemia    Vital Signs Last 24 Hrs  T(C): 35.8 (08 May 2018 14:05), Max: 37.9 (07 May 2018 19:29)  T(F): 96.5 (08 May 2018 14:05), Max: 100.3 (07 May 2018 19:29)  HR: 113 (08 May 2018 14:05) (104 - 113)  BP: 90/53 (08 May 2018 14:05) (90/53 - 111/62)  BP(mean): --  RR: 16 (08 May 2018 14:05) (16 - 21)  SpO2: 95% (08 May 2018 13:03) (93% - 98%)      PE:  chest: good AE , with rhonchi throughout   cardio: s1s2 heard  abd: bs not heard , distended and tender to touch     05-08    143  |  95<L>  |  19  ----------------------------<  106<H>  4.1   |  22  |  0.6<L>    Ca    8.5      08 May 2018 08:12    TPro  5.6<L>  /  Alb  2.9<L>  /  TBili  <0.2  /  DBili  x   /  AST  12  /  ALT  <5  /  AlkPhos  108  05-07                            9.6    23.12 )-----------( 614      ( 08 May 2018 08:12 )             31.4     CAPILLARY BLOOD GLUCOSE          < from: CT Abdomen and Pelvis w/ IV Cont (05.07.18 @ 18:34) >    IMPRESSION:    1. Diffuse abnormal dilation of large (up to 7 cm) and small bowel (up to   3.6 cm) with focal caliber change at distal descending colon; further   evaluation with colonoscopy recommended to exclude underlying obstructive   lesion.    2. Mucus plugging and/or debris within the visualized bronchioles with   bilateral lung base airspace opacity; correlate for aspiration with   aspiration pneumonia.    3. Liver segment 4 indeterminate lesion measuring 3.5 cm. Gallbladder   nondependent hyperdense material or mass. Further evaluation with   outpatient MRI abdomen and pelvis with and without IV contrast   recommended.    4. Left upper renal pole 4.2 cm indeterminate lesion measuring greater   than simple fluid density; renal proteinaceous cyst versus mass can be   differentiated on recommended MRI.    < end of copied text >

## 2018-05-08 NOTE — CHART NOTE - NSCHARTNOTEFT_GEN_A_CORE
Vital Signs Last 24 Hrs  T(C): 35.8 (08 May 2018 14:05), Max: 37 (08 May 2018 06:05)  T(F): 96.5 (08 May 2018 14:05), Max: 98.6 (08 May 2018 06:05)  HR: 113 (08 May 2018 14:05) (104 - 113)  BP: 90/53 (08 May 2018 14:05) (90/53 - 110/54)  BP(mean): --  RR: 16 (08 May 2018 14:05) (16 - 20)  SpO2: 95% (08 May 2018 13:03) (93% - 98%)    Reviewed CXR for NGT placement.  Prelim, read by me, tube is below the level of the hemidiaphragm and within the stomach.  May use NGT for meds and feeds.

## 2018-05-08 NOTE — PROGRESS NOTE ADULT - SUBJECTIVE AND OBJECTIVE BOX
KARINA CUNNINGHAM  82y  Female      Patient is a 82y old  Female who presents with a chief complaint of  vomitting,respiratory distress      REVIEW OF SYSTEMS:  CONSTITUTIONAL: No fever, weight loss, or fatigue  EYES: No eye pain, visual disturbances, or discharge  ENMT:  No difficulty hearing, tinnitus, vertigo; No sinus or throat pain  NECK: No pain or stiffness  BREASTS: No pain, masses, or nipple discharge  RESPIRATORY: No cough, wheezing, chills or hemoptysis; sob+  CARDIOVASCULAR: No chest pain, palpitations, dizziness, or leg swelling  GASTROINTESTINAL: abdominal pain,distended+  GENITOURINARY: No dysuria, frequency, hematuria, or incontinence  NEUROLOGICAL: No headaches, memory loss, loss of strength, numbness, or tremors  SKIN: No itching, burning, rashes, or lesions   LYMPH NODES: No enlarged glands  ENDOCRINE: No heat or cold intolerance; No hair loss  MUSCULOSKELETAL: No joint pain or swelling; No muscle, back, or extremity pain  PSYCHIATRIC: No depression, anxiety, mood swings, or difficulty sleeping  HEME/LYMPH: No easy bruising, or bleeding gums  ALLERY AND IMMUNOLOGIC: No hives or eczema  FAMILY HISTORY:    T(C): 37 (05-08-18 @ 06:05), Max: 37.9 (05-07-18 @ 19:29)  HR: 104 (05-08-18 @ 06:05) (103 - 116)  BP: 110/54 (05-08-18 @ 06:05) (92/62 - 131/60)  RR: 18 (05-08-18 @ 06:05) (18 - 21)  SpO2: 98% (05-08-18 @ 03:05) (93% - 98%)  Wt(kg): --Vital Signs Last 24 Hrs  T(C): 37 (08 May 2018 06:05), Max: 37.9 (07 May 2018 19:29)  T(F): 98.6 (08 May 2018 06:05), Max: 100.3 (07 May 2018 19:29)  HR: 104 (08 May 2018 06:05) (103 - 116)  BP: 110/54 (08 May 2018 06:05) (92/62 - 131/60)  BP(mean): --  RR: 18 (08 May 2018 06:05) (18 - 21)  SpO2: 98% (08 May 2018 03:05) (93% - 98%)  Bactrim (Unknown)  sulfa drugs (Unknown)  tetanus immune globulin (Unknown)      PHYSICAL EXAM:  GENERAL: NAD, well-groomed, well-developed  HEAD:  Atraumatic, Normocephalic  EYES: EOMI, PERRLA, conjunctiva and sclera clear  ENMT: No tonsillar erythema, exudates, or enlargement;   NECK: Supple, No JVD, Normal thyroid  NERVOUS SYSTEM:  Alert & Oriented X3, Good concentration; Motor Bwfhesag52/5 B/L upper and lower extremities; DTRs 2+ intact and symmetric  CHEST/LUNG: VBS with bilateral ronchi+,rales+  HEART: Regular rate and rhythm; No murmurs, rubs, or gallops  ABDOMEN: Soft, distended+  EXTREMITIES:  2+ Peripheral Pulses, No clubbing, cyanosis, or edema  LYMPH: No lymphadenopathy noted  SKIN: No rashes or lesions      LABS:  05-07    145  |  91<L>  |  17  ----------------------------<  130<H>  3.2<L>   |  30  |  0.7    Ca    8.9      07 May 2018 13:59    TPro  5.6<L>  /  Alb  2.9<L>  /  TBili  <0.2  /  DBili  x   /  AST  12  /  ALT  <5  /  AlkPhos  108  05-07                          11.3   28.75 )-----------( 677      ( 07 May 2018 13:59 )             36.4     < from: CT Abdomen and Pelvis w/ IV Cont (05.07.18 @ 18:34) >  1. Diffuse abnormal dilation of large (up to 7 cm) and small bowel (up to   3.6 cm) with focal caliber change at distal descending colon; further   evaluation with colonoscopy recommended to exclude underlying obstructive   lesion.    2. Mucus plugging and/or debris within the visualized bronchioles with   bilateral lung base airspace opacity; correlate for aspiration with   aspiration pneumonia.    3. Liver segment 4 indeterminate lesion measuring 3.5 cm. Gallbladder   nondependent hyperdense material or mass. Further evaluation with   outpatient MRI abdomen and pelvis with and without IV contrast   recommended.    4. Left upper renal pole 4.2 cm indeterminate lesion measuring greater   than simple fluid density; renal proteinaceous cyst versus mass can be   differentiated on recommended MRI.    < end of copied text >      RADIOLOGY & ADDITIONAL TESTS:    MEDICATION:  acetaminophen   Tablet. 650 milliGRAM(s) Oral every 6 hours PRN  ALBUTerol/ipratropium for Nebulization 3 milliLiter(s) Nebulizer every 6 hours  buDESOnide   0.5 milliGRAM(s) Respule 0.5 milliGRAM(s) Inhalation two times a day  carBAMazepine 200 milliGRAM(s) Oral two times a day  clopidogrel Tablet 75 milliGRAM(s) Oral daily  docusate sodium 100 milliGRAM(s) Oral daily  fentaNYL   Patch  75 MICROgram(s)/Hr 1 Patch Transdermal every 72 hours  furosemide    Tablet 20 milliGRAM(s) Oral daily  gabapentin Oral Tab/Cap - Peds 300 milliGRAM(s) Oral two times a day  heparin  Injectable 5000 Unit(s) SubCutaneous every 12 hours  hydrALAZINE 25 milliGRAM(s) Oral every 12 hours  lactobacillus acidophilus 1 Tablet(s) Oral daily  metoprolol tartrate 50 milliGRAM(s) Oral two times a day  montelukast 10 milliGRAM(s) Oral daily  pantoprazole    Tablet 40 milliGRAM(s) Oral before breakfast  piperacillin/tazobactam IVPB. 3.375 Gram(s) IV Intermittent every 8 hours  ranitidine  Oral Tab/Cap - Peds 150 milliGRAM(s) Oral two times a day  sertraline 50 milliGRAM(s) Oral daily  sodium chloride 0.9%. 1000 milliLiter(s) IV Continuous <Continuous>      HEALTH ISSUES - PROBLEM Dx:  Colon neoplasm: Colon neoplasm r/o GI consult  Aspiration pneumonia: Aspiration pneumonia on zosyn ,id consult ,CASE D/W SON ESTHER ABOUT PT CONDITION  ASTHMA ADD ON NEBULIZER  LBO SURGERY CONSULT FOR NG TO LOW GUMGO,KUB  HYPOKELEMIA REPLACE KCL  HTN ON HYDRALAZINE

## 2018-05-09 NOTE — CONSULT NOTE ADULT - ASSESSMENT
IMPRESSION  Pt from nursing home with COPD with suspected Gram negative/aspiration pneumonia    Pt with history Colon CA    Pt with leukocytosis, tachycardia, lactic acidosis     On piperacillin/tazobactam 3.375 Gram(s) IV Intermittent every 8 hours    CT Abdomen/pelvis:  1. Diffuse abnormal dilation of large (up to 7 cm) and small bowel (up to   3.6 cm) with focal caliber change at distal descending colon; further   evaluation with colonoscopy recommended to exclude underlying obstructive   lesion.    2. Mucus plugging and/or debris within the visualized bronchioles with   bilateral lung base airspace opacity; correlate for aspiration with   aspiration pneumonia.    3. Liver segment 4 indeterminate lesion measuring 3.5 cm. Gallbladder   nondependent hyperdense material or mass. Further evaluation with   outpatient MRI abdomen and pelvis with and without IV contrast   recommended.    4. Left upper renal pole 4.2 cm indeterminate lesion measuring greater   than simple fluid density; renal proteinaceous cyst versus mass can be   differentiated on recommended MRI.      SUGGESTIONs  Await blood cultures  Continue Zosyn  GI consult R/O obstruction +/- liver mets  Repeat white blood cell count  Suction for Sputum C&S and Gram stain  CHG 4% washes daily & PRN IMPRESSION  Pt from nursing home with COPD with suspected Gram negative/aspiration pneumonia    Pt with history Colon CA    Pt with leukocytosis, tachycardia, lactic acidosis     On piperacillin/tazobactam 3.375 Gram(s) IV Intermittent every 8 hours    CT Abdomen/pelvis:  1. Diffuse abnormal dilation of large (up to 7 cm) and small bowel (up to   3.6 cm) with focal caliber change at distal descending colon; further   evaluation with colonoscopy recommended to exclude underlying obstructive   lesion.    2. Mucus plugging and/or debris within the visualized bronchioles with   bilateral lung base airspace opacity; correlate for aspiration with   aspiration pneumonia.    3. Liver segment 4 indeterminate lesion measuring 3.5 cm. Gallbladder   nondependent hyperdense material or mass. Further evaluation with   outpatient MRI abdomen and pelvis with and without IV contrast   recommended.    4. Left upper renal pole 4.2 cm indeterminate lesion measuring greater   than simple fluid density; renal proteinaceous cyst versus mass can be   differentiated on recommended MRI.    Cxray: Pulmonary vascular congestion and left basilar airspace opacity.   Small right pleural effusion.      SUGGESTIONs  Await blood cultures  Continue Zosyn  GI consult R/O obstruction +/- liver mets  Repeat white blood cell count  Suction for Sputum C&S and Gram stain  CHG 4% washes daily & PRN  R/O acute heart failure

## 2018-05-09 NOTE — CONSULT NOTE ADULT - CONSULT REASON
Medication recommendation Medication recommendation and risk assessment Medication recommendation and risk assessment for Seizure

## 2018-05-09 NOTE — PROCEDURE NOTE - ADDITIONAL PROCEDURE DETAILS
5-Fr, 38cm single lumen PICC enters from LUE, with tip in SVC near caval atrial junction.  Works well and ready to use.

## 2018-05-09 NOTE — CONSULT NOTE ADULT - SUBJECTIVE AND OBJECTIVE BOX
Neurology/Epilepsy Consult:    KARINA CUNNINGHAM 82y Female  [id number]    Patient is a 82y old  Female who presents with a chief complaint of abdominal distention and vomiting    HPI: History obtained from patient, son, and EMR  82 years old female was admitted from [facility] 2 days ago with c/o distended abdomen and coffee ground vomit for 2 days. Patient was found to have left lower lobe pneumonia, as well as small and large bowel obstruction. Patient has NGT to low continuos suction, currently NPO. Was receiving Tegretol via NGT since admission, last dose at 6 am today. Per son, patient has history of seizures diagnosed in , but remains seizure-free for over 20 years on Tegretol. Prior seizures described as GTC events.       PAST MEDICAL & SURGICAL HISTORY:  Right pontine stroke 2011  Seizure disorder  Osteoporosis  COPD (chronic obstructive pulmonary disease)  High cholesterol  Colitis  Depression  Hypertension  Anemia      FAMILY HISTORY:  No seizures    Social History: (-) x 3      Allergy:  Bactrim (Unknown)  sulfa drugs (Unknown)  tetanus immune globulin (Unknown)      Home Medications:  Acidophilus Probiotic Blend oral capsule: 1 cap(s) orally once a day (07 May 2018 13:03)  carBAMazepine 200 mg oral tablet every 12 hours   DuoNeb 0.5 mg-2.5 mg/3 mL inhalation solution: 3 milliliter(s) inhaled 4 times a day (07 May 2018 13:03)  fentaNYL 75 mcg/hr transdermal film, extended release: 1 patch transdermal every 72 hours (07 May 2018 13:03)  gabapentin 300 mg oral capsule every 12 hours   hydrALAZINE 25 mg oral tablet:  (07 May 2018 13:03)  Metoprolol Tartrate 50 mg oral tablet: 1 tab(s) orally 2 times a day (07 May 2018 13:03)  Mylanta oral suspension:  (07 May 2018 13:03)  Plavix 75 mg oral tablet: 1 tab(s) orally once a day (07 May 2018 13:03)  Protonix 40 mg oral delayed release tablet: 1 tab(s) orally once a day (07 May 2018 13:03)  Pulmicort Respules 0.5 mg/2 mL inhalation suspension:  (07 May 2018 13:03)  sertraline 25 mg oral tablet: 1 tab(s) orally once a day (07 May 2018 13:03)  Singulair 10 mg oral tablet: 1 tab(s) orally once a day (07 May 2018 13:03)  Zantac 150 oral tablet:  (07 May 2018 13:03)  ZyrTEC 10 mg oral tablet: 1 tab(s) orally once a day (07 May 2018 13:03)      MEDICATIONS  (STANDING):  ALBUTerol/ipratropium for Nebulization 3 milliLiter(s) Nebulizer every 6 hours  carBAMazepine 200 milliGRAM(s) Oral two times a day  clopidogrel Tablet 75 milliGRAM(s) Oral daily  docusate sodium 100 milliGRAM(s) Oral daily  gabapentin Oral Tab/Cap - Peds 300 milliGRAM(s) Oral two times a day  heparin  Injectable 5000 Unit(s) SubCutaneous every 12 hours  lactobacillus acidophilus 1 Tablet(s) Oral daily  levETIRAcetam  IVPB 250 milliGRAM(s) IV Intermittent every 12 hours  metoprolol tartrate 50 milliGRAM(s) Oral two times a day  montelukast 10 milliGRAM(s) Oral daily  pantoprazole    Tablet 40 milliGRAM(s) Oral before breakfast  piperacillin/tazobactam IVPB. 3.375 Gram(s) IV Intermittent every 8 hours  ranitidine  Oral Tab/Cap - Peds 150 milliGRAM(s) Oral two times a day  sodium chloride 0.9%. 1000 milliLiter(s) (75 mL/Hr) IV Continuous <Continuous>    MEDICATIONS  (PRN):  acetaminophen   Tablet. 650 milliGRAM(s) Oral every 6 hours PRN Moderate Pain (4 - 6)  metoprolol tartrate Injectable 5 milliGRAM(s) IV Push every 6 hours PRN elevated HR  ondansetron Injectable 4 milliGRAM(s) IV Push every 6 hours PRN Nausea and/or Vomiting  temazepam 15 milliGRAM(s) Oral at bedtime PRN Insomnia      T(F): 97.6 (18 @ 05:28), Max: 98.9 (18 @ 20:29)  HR: 110 (18 @ 05:28) (108 - 121)  BP: 111/55 (18 @ 05:28) (96/57 - 112/58)  RR: 16 (18 @ 05:28) (16 - 16)  SpO2: 96% (18 @ 08:38) (96% - 96%)    Neurologic Examination:  General:  Appearance is consistent with chronologic age.  No abnormal facies.   General: The patient is oriented to person, place, and date. Follows 3-4-step directions. Language with normal repetition, comprehension and naming.  Nondysarthric.    Cranial nerves: EOMI w/o nystagmus, skew or reported double vision.  PERRL.  No ptosis/weakness of eyelid closure.  Facial sensation is normal with normal bite.  No facial asymmetry.  Hearing grossly intact b/l.  Palate elevates midline.  Tongue midline.  Motor examination:   No tenderness, twitching, tremors or involuntary movements.  Formal Muscle Strength Testing: Generalized weakness, able to move UE purposely. No observable drift. Unable to move LE bilaterally (baseline)  Cerebellum:  No dysmetria or dysdiadokinesia.      Labs:                         8.7    21.84 )-----------( 577      ( 09 May 2018 06:05 )             27.8     05-09    148<H>  |  102  |  14  ----------------------------<  86  3.6   |  23  |  0.5<L>    Ca    8.0<L>      09 May 2018 06:05    TPro  4.8<L>  /  Alb  2.4<L>  /  TBili  <0.2  /  DBili  x   /  AST  10  /  ALT  <5  /  AlkPhos  87  05-09    LIVER FUNCTIONS - ( 09 May 2018 06:05 )  Alb: 2.4 g/dL / Pro: 4.8 g/dL / ALK PHOS: 87 U/L / ALT: <5 U/L / AST: 10 U/L / GGT: x             Urinalysis Basic - ( 07 May 2018 15:00 )    Color: Yellow / Appearance: Slightly Cloudy / S.025 / pH: x  Gluc: x / Ketone: Trace  / Bili: Small / Urobili: 0.2 mg/dL   Blood: x / Protein: 30 mg/dL / Nitrite: Negative   Leuk Esterase: Trace / RBC: 5-10 /HPF / WBC 6-10 /HPF   Sq Epi: x / Non Sq Epi: Occasional /HPF / Bacteria: TNTC /HPF      Assessment:  This is a 82y Female with h/o stroke, seizure disorder, HTN, DLD, anemia, colitis, COPD, with small/large bowel obstruction and left lower lobe pneumonia. Currently NPO with NGT to LCS.     Discussed with Dr. Henriquez    Plan:   - REEG (ordered)  - Seizure precautions  - Start Keppra 250mg IV q12hrs (ordered)  - CBC, CMP, Mg, Tegretol level (ordered)  - Keep Mg above 2    Discussed with house PA    18 @ 14:17

## 2018-05-09 NOTE — PROGRESS NOTE ADULT - SUBJECTIVE AND OBJECTIVE BOX
Subjective: 83 yo female without new complaints. Pt Denies any abdominal pain, n/v, f/c. Pt admits to copious flatus but unsure of last BM. Pt wants NGT out             Vital Signs Last 24 Hrs  T(C): 36.4 (09 May 2018 05:28), Max: 37.2 (08 May 2018 20:29)  T(F): 97.6 (09 May 2018 05:28), Max: 98.9 (08 May 2018 20:29)  HR: 110 (09 May 2018 05:28) (108 - 121)  BP: 111/55 (09 May 2018 05:28) (90/53 - 112/58)  BP(mean): --  RR: 16 (09 May 2018 05:28) (16 - 16)  SpO2: 96% (09 May 2018 08:38) (95% - 96%)    General Appearance: Appears well, NAD  Neck: Supple  Chest: Equal expansion bilaterally, equal breath sounds  CV: Pulse regular presently  Abdomen: Soft, NT +distension, +hyperactive bs,  no rebound/gaurding  Extremities: Grossly symmetric, no calf tend b/l    Arvizu: yellow urine  NGT: bilious 400cc/shift      I&O's Summary    08 May 2018 07:  -  09 May 2018 07:00  --------------------------------------------------------  IN: 520 mL / OUT: 1600 mL / NET: -1080 mL      I&O's Detail    08 May 2018 07:  -  09 May 2018 07:00  --------------------------------------------------------  IN:    Free Water: 120 mL    IV PiggyBack: 100 mL    sodium chloride 0.9%.: 300 mL  Total IN: 520 mL    OUT:    Nasoenteral Tube: 400 mL    Ureteral Catheter: 1200 mL  Total OUT: 1600 mL    Total NET: -1080 mL          MEDICATIONS  (STANDING):  ALBUTerol/ipratropium for Nebulization 3 milliLiter(s) Nebulizer every 6 hours  carBAMazepine 200 milliGRAM(s) Oral two times a day  clopidogrel Tablet 75 milliGRAM(s) Oral daily  docusate sodium 100 milliGRAM(s) Oral daily  gabapentin Oral Tab/Cap - Peds 300 milliGRAM(s) Oral two times a day  heparin  Injectable 5000 Unit(s) SubCutaneous every 12 hours  lactobacillus acidophilus 1 Tablet(s) Oral daily  metoprolol tartrate 50 milliGRAM(s) Oral two times a day  montelukast 10 milliGRAM(s) Oral daily  pantoprazole    Tablet 40 milliGRAM(s) Oral before breakfast  piperacillin/tazobactam IVPB. 3.375 Gram(s) IV Intermittent every 8 hours  ranitidine  Oral Tab/Cap - Peds 150 milliGRAM(s) Oral two times a day  sodium chloride 0.9%. 1000 milliLiter(s) (75 mL/Hr) IV Continuous <Continuous>    MEDICATIONS  (PRN):  acetaminophen   Tablet. 650 milliGRAM(s) Oral every 6 hours PRN Moderate Pain (4 - 6)  temazepam 15 milliGRAM(s) Oral at bedtime PRN Insomnia      LABS:                        8.7    21.84 )-----------( 577      ( 09 May 2018 06:05 )             27.8     05-09    148<H>  |  102  |  14  ----------------------------<  86  3.6   |  23  |  0.5<L>    Ca    8.0<L>      09 May 2018 06:05    TPro  4.8<L>  /  Alb  2.4<L>  /  TBili  <0.2  /  DBili  x   /  AST  10  /  ALT  <5  /  AlkPhos  87  05-09    PT/INR - ( 07 May 2018 13:59 )   PT: 12.80 sec;   INR: 1.18 ratio         PTT - ( 07 May 2018 13:59 )  PTT:21.2 sec  Urinalysis Basic - ( 07 May 2018 15:00 )    Color: Yellow / Appearance: Slightly Cloudy / S.025 / pH: x  Gluc: x / Ketone: Trace  / Bili: Small / Urobili: 0.2 mg/dL   Blood: x / Protein: 30 mg/dL / Nitrite: Negative   Leuk Esterase: Trace / RBC: 5-10 /HPF / WBC 6-10 /HPF   Sq Epi: x / Non Sq Epi: Occasional /HPF / Bacteria: TNTC /HPF        RADIOLOGY & ADDITIONAL STUDIES:  Obstruct Series: Pending

## 2018-05-09 NOTE — PROGRESS NOTE ADULT - SUBJECTIVE AND OBJECTIVE BOX
Chief Complaint: Patient is a 82y old  Female who presents with a chief complaint of  abdominal distention r/o colon mass.  The pt is obtunded so hx from her is not available.  Put a call  into her sondanya Sanz and am awaiting a return call.  Spoke to son ED who says pt was able to converse on the phone was complaining of long h/o abdominal complaints and ? colitis .  Spoke to [facility] pt was responsive yesterday.  Abdom distenntion started seveeral weeksd asgo - X ray studies and sono reportedly negative.  No definitive diagnosis was ever made    Patient presents for abdominal distension,  States stomach feels better    Medications:  acetaminophen   Tablet. 650 milliGRAM(s) Oral every 6 hours PRN  ALBUTerol/ipratropium for Nebulization 3 milliLiter(s) Nebulizer every 6 hours  carBAMazepine 200 milliGRAM(s) Oral two times a day  clopidogrel Tablet 75 milliGRAM(s) Oral daily  docusate sodium 100 milliGRAM(s) Oral daily  fentaNYL   Patch  75 MICROgram(s)/Hr 1 Patch Transdermal every 72 hours  gabapentin Oral Tab/Cap - Peds 300 milliGRAM(s) Oral two times a day  heparin  Injectable 5000 Unit(s) SubCutaneous every 12 hours  hydrALAZINE 25 milliGRAM(s) Oral every 12 hours  lactobacillus acidophilus 1 Tablet(s) Oral daily  metoprolol tartrate 50 milliGRAM(s) Oral two times a day  montelukast 10 milliGRAM(s) Oral daily  pantoprazole    Tablet 40 milliGRAM(s) Oral before breakfast  piperacillin/tazobactam IVPB. 3.375 Gram(s) IV Intermittent every 8 hours  potassium chloride  20 mEq/100 mL IVPB 20 milliEquivalent(s) IV Intermittent once  ranitidine  Oral Tab/Cap - Peds 150 milliGRAM(s) Oral two times a day  sertraline 50 milliGRAM(s) Oral daily  sodium chloride 0.9%. 1000 milliLiter(s) IV Continuous <Continuous>      PMHX/PSHX:  Osteoporosis  COPD (chronic obstructive pulmonary disease) h/o pneumonia  High cholesterol  Colitis  Depression  Hypertension  Anemia  seizure disorder      Family history:  n/a    Social History: n/a    Allergies:  Bactrim (Unknown)  sulfa drugs (Unknown)  tetanus immune globulin (Unknown)        Review of Systems:  General:  No wt loss, fevers, chills, night sweats, fatigue or pruritis.  Eyes:  Good vision, no reported pain or redness.  ENT:  No sore throat, pain, runny nose, or difficulty swallowing  CV:  No pain, palpitations, hypo/hypertension  Resp:  No dyspnea, cough, tachypnea, wheezing  GI:  No pain, nausea, vomiting, dysphagia, heartburn, diarrhea, constipation, or weight loss. , No rectal bleeding, tarry stools, or hematemesis.  :  No pain, bleeding/discharges, incontinence, nocturia  Musculoskeletal:  No pain, weakness or fasciculations.  Neuro:  No weakness, tingling, memory problems or paresthesias  Psych:  No fatigue, insomnia, mood problems, depression  Endocrine:  No polyuria, polydipsia, cold/heat intolerance  Heme:  No petechiae, ecchymosis, easy bruisability  Skin:  No rash, pruritis, tattoos, scars, or edema      PHYSICAL EXAM:   Vital Signs Last 24 Hrs  T(C): 37.7 (09 May 2018 14:17), Max: 37.7 (09 May 2018 14:17)  T(F): 99.8 (09 May 2018 14:17), Max: 99.8 (09 May 2018 14:17)  HR: 113 (09 May 2018 14:17) (108 - 121)  BP: 106/59 (09 May 2018 14:17) (96/57 - 112/58)  BP(mean): --  RR: 18 (09 May 2018 14:17) (16 - 18)  SpO2: 96% (09 May 2018 08:38) (96% - 96%)    GENERAL:  Appears stated age, well-groomed, well-nourished, no distress  HEENT:  Conjunctivae clear and pink, no thyromegaly, nodules, adenopathy, no JVD, sclera -anicteric  CHEST:  Full & symmetric excursion, no increased effort, breath sounds clear  HEART:  Regular rhythm, S1, S2, no murmur/rub/S3/S4, no abdominal bruit, no edema  ABDOMEN:  Firm, non-tender,  distended, hyperactive  bowel sounds,  no masses ,no hepato-splenomegaly, no signs of chronic liver disease  EXTEREMITIES:  no cyanosis,clubbing or edema  SKIN:  No rash/erythema/ecchymoses/petechiae/wounds/abscess/warm/dry  NEURO:  Alert, oriented, no asterixis, no tremor, no encephalopathy    LABS:                                     8.7    21.84 )-----------( 577      ( 09 May 2018 06:05 )             27.8     05-09    148<H>  |  104  |  13  ----------------------------<  72  3.9   |  21  |  0.5<L>    Ca    8.3<L>      09 May 2018 13:59    TPro  4.8<L>  /  Alb  2.2<L>  /  TBili  <0.2  /  DBili  x   /  AST  12  /  ALT  <5  /  AlkPhos  84  05-09    LIVER FUNCTIONS - ( 09 May 2018 13:59 )  Alb: 2.2 g/dL / Pro: 4.8 g/dL / ALK PHOS: 84 U/L / ALT: <5 U/L / AST: 12 U/L / GGT: x                   Ammonia--    GGT--        Imaging: CT noted      Assessment and Plan:

## 2018-05-09 NOTE — CONSULT NOTE ADULT - SUBJECTIVE AND OBJECTIVE BOX
KARINA CUNNINGHAM 82yFemalePatient is a 82y old  Female who presents with a chief complaint of     Patient has history of:  Bactrim (Unknown)  sulfa drugs (Unknown)  tetanus immune globulin (Unknown)      Adult/[facility] Home residence    ASPIRATION PNEUMONIA COLON NEOPLASM  ^VOMITING  No h/o HF  Unknown h/o HF  Yes  Handoff  MEWS Score  Osteoporosis  COPD (chronic obstructive pulmonary disease)  High cholesterol  Colitis  Depression  Hypertension  Anemia  Aspiration pneumonia  Colon neoplasm  Aspiration pneumonia  VOMITING  Colon neoplasm        Patient treated with:  piperacillin/tazobactam IVPB. 3.375 Gram(s) IV Intermittent every 8 hours        PHYSICAL EXAM  T(F): 97.6 (18 @ 05:28), Max: 98.9 (18 @ 20:29)  HR: 110 (18 @ 05:28) (108 - 121)  BP: 111/55 (18 @ 05:28) (90/53 - 112/58)  RR: 16 (18 @ 05:28) (16 - 16)  SpO2: 96% (18 @ 08:38) (95% - 96%)  Daily     Daily   HEENT: normal, no nuchal rigidity  Cor: RSR Nl S1 S2  Lungs: clear  Decreased breath sounds at bases    Abdomen: Nontender, Nl BS,     Ext: No clubbing,cyanosis or edema    LAB & RADIOLOGIC RESULTS:                        8.7    21.84 )-----------( 577      ( 09 May 2018 06:05 )             27.8             143  |  95<L>  |  19  ----------------------------<  106<H>  4.1   |  22  |  0.6<L>      TPro  5.6<L>  /  Alb  2.9<L>  /  TBili  <0.2  /  DBili  x   /  AST  12  /  ALT  <5  /  AlkPhos  108      <--<9>>2.2       Lactic Acidosis   Blood Gas Venous - Lactate: 2.2 mmoL/L (18 @ 14:05)  Lactate, Blood: 1.9 mmol/L (18 @ 13:59)    Acidosis          Urinalysis Basic - ( 07 May 2018 15:00 )    Color: Yellow / Appearance: Slightly Cloudy / S.025 / pH: x  Gluc: x / Ketone: Trace  / Bili: Small / Urobili: 0.2 mg/dL   Blood: x / Protein: 30 mg/dL / Nitrite: Negative   Leuk Esterase: Trace / RBC: 5-10 /HPF / WBC 6-10 /HPF   Sq Epi: x / Non Sq Epi: Occasional /HPF / Bacteria: TNTC /HPF      PT/INR - ( 07 May 2018 13:59 )   PT: 12.80 sec;   INR: 1.18 ratio         PTT - ( 07 May 2018 13:59 )  PTT:21.2 sec

## 2018-05-09 NOTE — PROGRESS NOTE ADULT - SUBJECTIVE AND OBJECTIVE BOX
KARINA CUNNINGHAM  82y  Female      Patient is a 82y old  Female who presents with a chief complaint of       REVIEW OF SYSTEMS:  CONSTITUTIONAL: No fever, weight loss, or fatigue  EYES: No eye pain, visual disturbances, or discharge  ENMT:  No difficulty hearing, tinnitus, vertigo; No sinus or throat pain  NECK: No pain or stiffness  BREASTS: No pain, masses, or nipple discharge  RESPIRATORY: cough+  CARDIOVASCULAR: No chest pain, palpitations, dizziness, or leg swelling  GASTROINTESTINAL: abdominal distension+  GENITOURINARY: No dysuria, frequency, hematuria, or incontinence  NEUROLOGICAL: No headaches, memory loss, loss of strength, numbness, or tremors  SKIN: No itching, burning, rashes, or lesions   LYMPH NODES: No enlarged glands  ENDOCRINE: No heat or cold intolerance; No hair loss  MUSCULOSKELETAL: No joint pain or swelling; No muscle, back, or extremity pain  PSYCHIATRIC: No depression, anxiety, mood swings, or difficulty sleeping  HEME/LYMPH: No easy bruising, or bleeding gums  ALLERY AND IMMUNOLOGIC: No hives or eczema  FAMILY HISTORY:    T(C): 36.4 (05-09-18 @ 05:28), Max: 37.2 (05-08-18 @ 20:29)  HR: 110 (05-09-18 @ 05:28) (108 - 121)  BP: 111/55 (05-09-18 @ 05:28) (90/53 - 112/58)  RR: 16 (05-09-18 @ 05:28) (16 - 16)  SpO2: 95% (05-08-18 @ 13:03) (95% - 95%)  Wt(kg): --Vital Signs Last 24 Hrs  T(C): 36.4 (09 May 2018 05:28), Max: 37.2 (08 May 2018 20:29)  T(F): 97.6 (09 May 2018 05:28), Max: 98.9 (08 May 2018 20:29)  HR: 110 (09 May 2018 05:28) (108 - 121)  BP: 111/55 (09 May 2018 05:28) (90/53 - 112/58)  BP(mean): --  RR: 16 (09 May 2018 05:28) (16 - 16)  SpO2: 95% (08 May 2018 13:03) (95% - 95%)  Bactrim (Unknown)  sulfa drugs (Unknown)  tetanus immune globulin (Unknown)      PHYSICAL EXAM:  GENERAL: NAD, well-groomed, well-developed  HEAD:  Atraumatic, Normocephalic  EYES: EOMI, PERRLA, conjunctiva and sclera clear  ENMT: No tonsillar erythema, exudates, or enlargement;   NECK: Supple, No JVD, Normal thyroid  NERVOUS SYSTEM:  Alert & Oriented X3, Motor Strength 4/5 B/L upper and lower extremities; DTRs 2+ intact and symmetric  CHEST/LUNG: Clear to percussion bilaterally; minimal rales+  HEART: Regular rate and rhythm; No murmurs, rubs, or gallops  ABDOMEN: Soft, distended less than yesterday  EXTREMITIES:  2+ Peripheral Pulses, No clubbing, cyanosis, or edema  LYMPH: No lymphadenopathy noted  SKIN: No rashes or lesions      LABS:  05-08    143  |  95<L>  |  19  ----------------------------<  106<H>  4.1   |  22  |  0.6<L>    Ca    8.5      08 May 2018 08:12    TPro  5.6<L>  /  Alb  2.9<L>  /  TBili  <0.2  /  DBili  x   /  AST  12  /  ALT  <5  /  AlkPhos  108  05-07                          9.6    23.12 )-----------( 614      ( 08 May 2018 08:12 )             31.4         RADIOLOGY & ADDITIONAL TESTS:    MEDICATION:  acetaminophen   Tablet. 650 milliGRAM(s) Oral every 6 hours PRN  ALBUTerol/ipratropium for Nebulization 3 milliLiter(s) Nebulizer every 6 hours  carBAMazepine 200 milliGRAM(s) Oral two times a day  clopidogrel Tablet 75 milliGRAM(s) Oral daily  docusate sodium 100 milliGRAM(s) Oral daily  gabapentin Oral Tab/Cap - Peds 300 milliGRAM(s) Oral two times a day  heparin  Injectable 5000 Unit(s) SubCutaneous every 12 hours  hydrALAZINE 25 milliGRAM(s) Oral every 12 hours  lactobacillus acidophilus 1 Tablet(s) Oral daily  metoprolol tartrate 50 milliGRAM(s) Oral two times a day  montelukast 10 milliGRAM(s) Oral daily  pantoprazole    Tablet 40 milliGRAM(s) Oral before breakfast  piperacillin/tazobactam IVPB. 3.375 Gram(s) IV Intermittent every 8 hours  ranitidine  Oral Tab/Cap - Peds 150 milliGRAM(s) Oral two times a day  sodium chloride 0.9%. 1000 milliLiter(s) IV Continuous <Continuous>  temazepam 15 milliGRAM(s) Oral at bedtime PRN      HEALTH ISSUES - PROBLEM Dx:  Colon neoplasm: Colon neoplasm  Aspiration pneumonia: Aspiration pneumonia on zosyn  LBO bowel obstruction on ng to low gumco,follow up xr  CAHD on plavix  neuropathy on gabapentin

## 2018-05-09 NOTE — PROCEDURE NOTE - NSPROCDETAILS_GEN_ALL_CORE
location identified, draped/prepped, sterile technique used/ultrasound assessment/supine position/sterile dressing applied/sterile technique, catheter placed/ultrasound guidance

## 2018-05-09 NOTE — CHART NOTE - NSCHARTNOTEFT_GEN_A_CORE
Called by RN, patient needs to be on continuos suction and PO medications can no longer be given.  Will hold all PO medications.  Will give Lopressor 5mg PO q6h PRN for HR control.  Patient on Gabapentin/Carbamazepine for seizure PPX.  Unable to give PO.  Will order Neurology/Epileptology consult for adequate substitution.  Also concern for long term ABX and patient difficult stick.  Attending requesting IR c/s.  Ordered.

## 2018-05-10 NOTE — PROGRESS NOTE ADULT - SUBJECTIVE AND OBJECTIVE BOX
Chart reviewed, patient examined. Pertinent results reviewed.  HD#4; NH papers reviewed  Pt c/o-dry mouth & wants NGT out  KARINA CUNNINGHAM  82y  Female      Patient is a 82y old  Female who presents with a chief complaint of N/V, abdominal pain and fever.  ED w/u-aspiration PNA & Bowel obstruction    SH-long term at NH  REVIEW OF SYSTEMS:  CONSTITUTIONAL: +fever& fatigue  EYES: No eye pain, visual disturbances, or discharge  ENMT:  No difficulty hearing, tinnitus, vertigo; No sinus or throat pain  NECK: No pain or stiffness  BREASTS: No pain, masses, or nipple discharge  RESPIRATORY: cough+  CARDIOVASCULAR: No chest pain, palpitations, dizziness, or leg swelling  GASTROINTESTINAL: abdominal distension+, + N/V-? coffee grounds  GENITOURINARY:comes w cathie from NH  NEUROLOGICAL: No headaches, s/p CVA-old  SKIN: No itching, burning, rashes, or lesions   LYMPH NODES: No enlarged glands  ENDOCRINE: No heat or cold intolerance; No hair loss  MUSCULOSKELETAL: No joint pain or swelling; No muscle, back, or extremity pain  PSYCHIATRIC: No depression, anxiety, mood swings, or difficulty sleeping  HEME/LYMPH: No easy bruising, or bleeding gums  ALLERY AND IMMUNOLOGIC: No hives or eczema  FAMILY HISTORY:    TVital Signs Last 24 Hrs  T(C): 37 (10 May 2018 05:30), Max: 37.7 (09 May 2018 14:17)  T(F): 98.6 (10 May 2018 05:30), Max: 99.8 (09 May 2018 14:17)  HR: 117 (10 May 2018 05:30) (113 - 117)  BP: 119/64 (10 May 2018 05:30) (106/59 - 119/64)  BP(mean): --  RR: 16 (10 May 2018 05:30) (16 - 18)  SpO2: --Bactrim (Unknown)  sulfa drugs (Unknown)  tetanus immune globulin (Unknown)      PHYSICAL EXAM:  GENERAL: NAD, well-groomed, well-developed; older F w NGT-anxious, Ox3-  HEAD:  Atraumatic, Normocephalic  EYES: EOMI, PERRLA, conjunctiva and sclera clear  ENMT: No tonsillar erythema; mm sl dry; NGT   NECK: Supple, No JVD, Normal thyroid  NERVOUS SYSTEM:  Alert & Oriented X3, Motor Strength 4/5 B/L upper and lower extremities; DTRs 2+ intact and symmetric; R foot drop  CHEST/LUNG: Clear to percussion bilaterally; minimal rales+at bases; DECreasedinspiration  HEART: Regular rate and rhythm; No murmurs, rubs, or gallops  ABDOMEN: Soft, distended less than yesterday; NT, no mass  EXTREMITIES:  2+ Peripheral Pulses, No clubbing, cyanosis, + edema both feet  LYMPH: No lymphadenopathy noted  SKIN: No rashes or lesions      LABS:                        8.7    21.84 )-----------( 577      ( 09 May 2018 06:05 )             27.8   05-09    146  |  101  |  11  ----------------------------<  75  3.2<L>   |  18  |  0.6<L>    Ca    8.1<L>      09 May 2018 20:46  Mg     2.3     05-09    TPro  4.8<L>  /  Alb  2.3<L>  /  TBili  <0.2  /  DBili  x   /  AST  11  /  ALT  <5  /  AlkPhos  78  05-09 05-08    143  |  95<L>  |  19  ----------------------------<  106<H>  4.1   |  22  |  0.6<L>    Ca    8.5      08 May 2018 08:12    TPro  5.6<L>  /  Alb  2.9<L>  /  TBili  <0.2  /  DBili  x   /  AST  12  /  ALT  <5  /  AlkPhos  108  05-07                          9.6    23.12 )-----------( 614      ( 08 May 2018 08:12 )             31.4         RADIOLOGY & ADDITIONAL TESTS:    MEDICATION:  acetaminophen   Tablet. 650 milliGRAM(s) Oral every 6 hours PRN  ALBUTerol/ipratropium for Nebulization 3 milliLiter(s) Nebulizer every 6 hours  carBAMazepine 200 milliGRAM(s) Oral two times a day  clopidogrel Tablet 75 milliGRAM(s) Oral daily  docusate sodium 100 milliGRAM(s) Oral daily  gabapentin Oral Tab/Cap - Peds 300 milliGRAM(s) Oral two times a day  heparin  Injectable 5000 Unit(s) SubCutaneous every 12 hours  hydrALAZINE 25 milliGRAM(s) Oral every 12 hours  lactobacillus acidophilus 1 Tablet(s) Oral daily  metoprolol tartrate 50 milliGRAM(s) Oral two times a day  montelukast 10 milliGRAM(s) Oral daily  pantoprazole    Tablet 40 milliGRAM(s) Oral before breakfast  piperacillin/tazobactam IVPB. 3.375 Gram(s) IV Intermittent every 8 hours  ranitidine  Oral Tab/Cap - Peds 150 milliGRAM(s) Oral two times a day  sodium chloride 0.9%. 1000 milliLiter(s) IV Continuous <Continuous>  temazepam 15 milliGRAM(s) Oral at bedtime PRN      HEALTH ISSUES - PROBLEM Dx:  Colon neoplasm: Colon neoplasm-possible; GI/surgery f/u, plan  Aspiration pneumonia: Aspiration pneumonia on zosyn; ID noted  LBO bowel obstruction on ng to low gumco,follow up xray; ok for ice chips  CAHD, s/p CVA on plavix-will stop if bleeding  neuropathy on gabapentin

## 2018-05-10 NOTE — PROGRESS NOTE ADULT - SUBJECTIVE AND OBJECTIVE BOX
81 yo female without new complaints.   Pt Denies any abdominal pain, n/v, f/c. Pt admits to copious flatus but NO of last BM.   Pt wants NGT out     GI: poss colonoscopy 5/11    Vital Signs Last 24 Hrs  T(C): 37 (10 May 2018 05:30), Max: 37.7 (09 May 2018 14:17)  T(F): 98.6 (10 May 2018 05:30), Max: 99.8 (09 May 2018 14:17)  HR: 117 (10 May 2018 05:30) (113 - 117)  BP: 119/64 (10 May 2018 05:30) (106/59 - 119/64)  BP(mean): --  RR: 16 (10 May 2018 05:30) (16 - 18)    PE:  chest: cta b/l  cardio: s1s2 heard  abd: BS not heard , marked distention : ngt inplace  NT no rt    05-09    146  |  101  |  11  ----------------------------<  75  3.2<L>   |  18  |  0.6<L>    Ca    8.1<L>      09 May 2018 20:46  Mg     2.3     05-09    TPro  4.8<L>  /  Alb  2.3<L>  /  TBili  <0.2  /  DBili  x   /  AST  11  /  ALT  <5  /  AlkPhos  78  05-09                            8.7    21.84 )-----------( 577      ( 09 May 2018 06:05 )             27.8

## 2018-05-10 NOTE — PROGRESS NOTE ADULT - SUBJECTIVE AND OBJECTIVE BOX
Patient is a 82y old  Female who presents with a chief complaint of     HPI:  82 years old female coming fro [facility] c/o distended abdomen and coffe ground vomit for 2 days. (07 May 2018 23:32)      INTERVAL HPI/OVERNIGHT EVENTS: Pt seen and examined at bedside, in NAD. Denies N/V/D, abdominal pain, CP, SOB, palpitations, fevers.  Pt much more alert.  Over 1 liter in NG aspirate  Abdom improved but still distended ewithfullness in LLQ  Pt wants NGT out and threatens to pull it outm Pt counselled    REVIEW OF SYSTEMS:  CONSTITUTIONAL: No fever, weight loss, or fatigue  EYES: No eye pain, visual disturbances, or discharge  ENMT:  No difficulty hearing, tinnitus, vertigo; No sinus or throat pain  NECK: No pain or stiffness  BREASTS: No pain, no masses,   RESPIRATORY: No cough, wheezing, chills or hemoptysis; No shortness of breath  CARDIOVASCULAR: No chest pain, palpitations, dizziness, or leg swelling  GASTROINTESTINAL: No abdominal or epigastric pain. No nausea, vomiting, or hematemesis; No diarrhea or constipation. No melena or hematochezia.  GENITOURINARY: No dysuria, frequency, hematuria, or incontinence  NEUROLOGICAL: No headaches, memory loss, loss of strength, numbness, or tremors  SKIN: No itching, burning, rashes, or lesions   LYMPH NODES: No enlarged glands  MUSCULOSKELETAL: No joint pain or swelling; No muscle, back, or extremity pain  PSYCHIATRIC: No depression, anxiety, mood swings, or difficulty sleeping  ALLERY No hives or eczema    PHYSICAL EXAM:  GENERAL: NAD, well-groomed, well-developed  HEAD:  Atraumatic, Normocephalic  EYES: EOMI, PERRLA, conjunctiva and sclera clear  ENMT: No tonsillar erythema, exudates, or enlargement; Moist mucous membranes, Good dentition, No lesions  NECK: Supple, No JVD, Normal thyroid  NERVOUS SYSTEM:  Alert & Oriented X3, Good concentration; Motor Strength 5/5 B/L upper and lower extremities; DTRs 2+ intact and symmetric  CHEST/LUNG: Clear to percussion bilaterally; No rales, rhonchi, wheezing, or rubs  HEART: Regular rate and rhythm; No murmurs, rubs, or gallops  ABDOMEN:see above  EXTREMITIES:  2+ Peripheral Pulses, No clubbing, cyanosis, or edema  LYMPH: No lymphadenopathy noted  SKIN: No rashes or lesions    T(C): 36.5 (05-10-18 @ 14:38), Max: 37 (05-10-18 @ 05:30)  HR: 108 (05-10-18 @ 14:38) (108 - 117)  BP: 103/55 (05-10-18 @ 14:38) (103/55 - 119/64)  RR: 16 (05-10-18 @ 14:38) (16 - 16)  SpO2: --  Wt(kg): --  I&O's Summary    09 May 2018 07:01  -  10 May 2018 07:00  --------------------------------------------------------  IN: 0 mL / OUT: 2250 mL / NET: -2250 mL    10 May 2018 07:01  -  10 May 2018 17:52  --------------------------------------------------------  IN: 0 mL / OUT: 1050 mL / NET: -1050 mL        MEDICATIONS  (STANDING):  ALBUTerol/ipratropium for Nebulization 3 milliLiter(s) Nebulizer every 6 hours  carBAMazepine 200 milliGRAM(s) Oral two times a day  clopidogrel Tablet 75 milliGRAM(s) Oral daily  docusate sodium 100 milliGRAM(s) Oral daily  gabapentin Oral Tab/Cap - Peds 300 milliGRAM(s) Oral two times a day  heparin  Injectable 5000 Unit(s) SubCutaneous every 12 hours  lactobacillus acidophilus 1 Tablet(s) Oral daily  levETIRAcetam  IVPB 375 milliGRAM(s) IV Intermittent every 12 hours  metoprolol tartrate 50 milliGRAM(s) Oral two times a day  montelukast 10 milliGRAM(s) Oral daily  pantoprazole    Tablet 40 milliGRAM(s) Oral before breakfast  piperacillin/tazobactam IVPB. 3.375 Gram(s) IV Intermittent every 8 hours  sodium chloride 0.9%. 1000 milliLiter(s) (75 mL/Hr) IV Continuous <Continuous>    MEDICATIONS  (PRN):  acetaminophen   Tablet. 650 milliGRAM(s) Oral every 6 hours PRN Moderate Pain (4 - 6)  metoprolol tartrate Injectable 5 milliGRAM(s) IV Push every 6 hours PRN elevated HR  ondansetron Injectable 4 milliGRAM(s) IV Push every 6 hours PRN Nausea and/or Vomiting  temazepam 15 milliGRAM(s) Oral at bedtime PRN Insomnia      LABS:                        8.7    21.84 )-----------( 577      ( 09 May 2018 06:05 )             27.8     05-09    146  |  101  |  11  ----------------------------<  75  3.2<L>   |  18  |  0.6<L>    Ca    8.1<L>      09 May 2018 20:46  Mg     2.3     05-09    TPro  4.8<L>  /  Alb  2.3<L>  /  TBili  <0.2  /  DBili  x   /  AST  11  /  ALT  <5  /  AlkPhos  78  05-09        CAPILLARY BLOOD GLUCOSE          05-08 @ 19:20   10,000 - 49,000 CFU/mL Yeast  --  --  05-08 @ 14:57   No growth to date.  --  --          RADIOLOGY & ADDITIONAL TESTS:    Imaging Personally Reviewed:       Advance Directives:      Palliative Care:

## 2018-05-10 NOTE — PROGRESS NOTE ADULT - SUBJECTIVE AND OBJECTIVE BOX
infectious diseases progress note:  KARINA CUNNINGHAM is a 82yFemale patient    ASPIRATION PNEUMONIA COLON NEOPLASM    Complete intestinal obstruction, unspecified cause  Colon neoplasm  Aspiration pneumonia      ROS:  CONSTITUTIONAL:  Negative fever or chills, feels well, good appetite  EYES:  Negative  blurry vision or double vision  CARDIOVASCULAR:  Negative for chest pain or palpitations  RESPIRATORY:  Negative for cough, wheezing, or SOB   GASTROINTESTINAL:  Negative for nausea, vomiting, diarrhea, constipation, or abdominal pain  GENITOURINARY:  Negative frequency, urgency or dysuria  NEUROLOGIC:  No headache, confusion, dizziness, lightheadedness    Allergies    Bactrim (Unknown)  sulfa drugs (Unknown)  tetanus immune globulin (Unknown)    Intolerances        ANTIBIOTICS/RELEVANT:  antimicrobials  piperacillin/tazobactam IVPB. 3.375 Gram(s) IV Intermittent every 8 hours    immunologic:    OTHER:  acetaminophen   Tablet. 650 milliGRAM(s) Oral every 6 hours PRN  ALBUTerol/ipratropium for Nebulization 3 milliLiter(s) Nebulizer every 6 hours  carBAMazepine 200 milliGRAM(s) Oral two times a day  clopidogrel Tablet 75 milliGRAM(s) Oral daily  docusate sodium 100 milliGRAM(s) Oral daily  gabapentin Oral Tab/Cap - Peds 300 milliGRAM(s) Oral two times a day  heparin  Injectable 5000 Unit(s) SubCutaneous every 12 hours  lactobacillus acidophilus 1 Tablet(s) Oral daily  levETIRAcetam  IVPB 250 milliGRAM(s) IV Intermittent every 12 hours  metoprolol tartrate 50 milliGRAM(s) Oral two times a day  metoprolol tartrate Injectable 5 milliGRAM(s) IV Push every 6 hours PRN  montelukast 10 milliGRAM(s) Oral daily  ondansetron Injectable 4 milliGRAM(s) IV Push every 6 hours PRN  pantoprazole    Tablet 40 milliGRAM(s) Oral before breakfast  sodium chloride 0.9%. 1000 milliLiter(s) IV Continuous <Continuous>  temazepam 15 milliGRAM(s) Oral at bedtime PRN      Objective:  T(F): 98.6 (05-10-18 @ 05:30), Max: 99.8 (05-09-18 @ 14:17)  HR: 117 (05-10-18 @ 05:30) (113 - 117)  BP: 119/64 (05-10-18 @ 05:30) (106/59 - 119/64)  RR: 16 (05-10-18 @ 05:30) (16 - 18)  SpO2: --    PHYSICAL EXAM  Constitutional:Well-developed, well nourished  Eyes:RAMIRO, EOMI  Ear/Nose/Throat: no oral lesion, no sinus tenderness on percussion	  Neck:no JVD, no lymphadenopathy, supple  Respiratory: CTA talat  Cardiovascular: S1S2 RRR, no murmurs  Gastrointestinal:soft, (+) BS, no HSM  Extremities:no e/e/c    05-09    146  |  101  |  11  ----------------------------<  75  3.2<L>   |  18  |  0.6<L>    Mg     2.3     05-09    TPro  4.8<L>  /  Alb  2.3<L>  /  TBili  <0.2  /  DBili  x   /  AST  11  /  ALT  <5  /  AlkPhos  78  05-09                            8.7    21.84 )-----------( 577      ( 09 May 2018 06:05 )             27.8           Culture - Blood (collected 08 May 2018 14:57)  Source: .Blood Blood  Preliminary Report (10 May 2018 01:02):    No growth to date.

## 2018-05-11 NOTE — PROGRESS NOTE ADULT - SUBJECTIVE AND OBJECTIVE BOX
KARINA CUNNINGHAM  82y  Female      Patient is a 82y old  Female who presents with a chief complaint of vomitting,abdomen distension on NG,going for colonoscopy      REVIEW OF SYSTEMS:  CONSTITUTIONAL: No fever, weight loss, or fatigue  EYES: No eye pain, visual disturbances, or discharge  ENMT:  No difficulty hearing, tinnitus, vertigo; No sinus or throat pain  NECK: No pain or stiffness  BREASTS: No pain, masses, or nipple discharge  RESPIRATORY: No cough, wheezing, chills or hemoptysis; No shortness of breath  CARDIOVASCULAR: No chest pain, palpitations, dizziness, or leg swelling  GASTROINTESTINAL: No abdominal or epigastric pain. NG suction  GENITOURINARY: No dysuria, frequency, hematuria, or incontinence  NEUROLOGICAL: No headaches, memory loss, loss of strength, numbness, or tremors  SKIN: No itching, burning, rashes, or lesions   LYMPH NODES: No enlarged glands  ENDOCRINE: No heat or cold intolerance; No hair loss  MUSCULOSKELETAL: No joint pain or swelling; No muscle, back, or extremity pain  PSYCHIATRIC: No depression, anxiety, mood swings, or difficulty sleeping  HEME/LYMPH: No easy bruising, or bleeding gums  ALLERY AND IMMUNOLOGIC: No hives or eczema  FAMILY HISTORY:    T(C): 36.6 (05-11-18 @ 05:18), Max: 36.6 (05-11-18 @ 05:18)  HR: 114 (05-11-18 @ 05:18) (107 - 114)  BP: 123/73 (05-11-18 @ 05:18) (103/55 - 129/63)  RR: 16 (05-11-18 @ 05:18) (16 - 19)  SpO2: --  Wt(kg): --Vital Signs Last 24 Hrs  T(C): 36.6 (11 May 2018 05:18), Max: 36.6 (11 May 2018 05:18)  T(F): 97.8 (11 May 2018 05:18), Max: 97.8 (11 May 2018 05:18)  HR: 114 (11 May 2018 05:18) (107 - 114)  BP: 123/73 (11 May 2018 05:18) (103/55 - 129/63)  BP(mean): --  RR: 16 (11 May 2018 05:18) (16 - 19)  SpO2: --  Bactrim (Unknown)  sulfa drugs (Unknown)  tetanus immune globulin (Unknown)      PHYSICAL EXAM:  GENERAL: NAD, well-groomed, well-developed  HEAD:  Atraumatic, Normocephalic  EYES: EOMI, PERRLA, conjunctiva and sclera clear  ENMT: No tonsillar erythema, exudates, or enlargement;  NECK: Supple, No JVD, Normal thyroid  NERVOUS SYSTEM:  Alert & Oriented X3, Good concentration; Motor Strength 4/5 B/L upper and lower extremities; DTRs 2+ intact and symmetric  CHEST/LUNG: Clear to percussion bilaterally; No rales, rhonchi, wheezing, or rubs  HEART: Regular rate and rhythm; No murmurs, rubs, or gallops  ABDOMEN: Soft, Nontender, slightly distended  EXTREMITIES:  2+ Peripheral Pulses, No clubbing, cyanosis, or edema  LYMPH: No lymphadenopathy noted  SKIN: No rashes or lesions      LABS:  05-09    146  |  101  |  11  ----------------------------<  75  3.2<L>   |  18  |  0.6<L>    Ca    8.1<L>      09 May 2018 20:46  Mg     2.3     05-09    TPro  4.8<L>  /  Alb  2.3<L>  /  TBili  <0.2  /  DBili  x   /  AST  11  /  ALT  <5  /  AlkPhos  78  05-09          RADIOLOGY & ADDITIONAL TESTS:    MEDICATION:  acetaminophen   Tablet. 650 milliGRAM(s) Oral every 6 hours PRN  ALBUTerol/ipratropium for Nebulization 3 milliLiter(s) Nebulizer every 6 hours  carBAMazepine 200 milliGRAM(s) Oral two times a day  clopidogrel Tablet 75 milliGRAM(s) Oral daily  docusate sodium 100 milliGRAM(s) Oral daily  gabapentin Oral Tab/Cap - Peds 300 milliGRAM(s) Oral two times a day  heparin  Injectable 5000 Unit(s) SubCutaneous every 12 hours  lactobacillus acidophilus 1 Tablet(s) Oral daily  levETIRAcetam  IVPB 375 milliGRAM(s) IV Intermittent every 12 hours  metoprolol tartrate 50 milliGRAM(s) Oral two times a day  metoprolol tartrate Injectable 5 milliGRAM(s) IV Push every 6 hours PRN  montelukast 10 milliGRAM(s) Oral daily  ondansetron Injectable 4 milliGRAM(s) IV Push every 6 hours PRN  pantoprazole    Tablet 40 milliGRAM(s) Oral before breakfast  piperacillin/tazobactam IVPB. 3.375 Gram(s) IV Intermittent every 8 hours  sodium chloride 0.9%. 1000 milliLiter(s) IV Continuous <Continuous>  temazepam 15 milliGRAM(s) Oral at bedtime PRN  zolpidem 5 milliGRAM(s) Enteral Tube at bedtime PRN      HEALTH ISSUES - PROBLEM Dx:  Complete intestinal obstruction, unspecified cause: Complete intestinal obstruction, unspecified cause  Colon neoplasm: Colon neoplasm,going for colonoscopy today  Aspiration pneumonia: Aspiration pneumonia, s/p lt arm picc line zosyn  cahd on plavix  seizure disorder on keppra  copd on albuterol nebulizer

## 2018-05-11 NOTE — PROGRESS NOTE ADULT - SUBJECTIVE AND OBJECTIVE BOX
Patient is a 82y old  Female who presents with a chief complaint of     HPI:  82 years old female coming fro [facility] c/o distended abdomen and coffe ground vomit for 2 days. (07 May 2018 23:32)      INTERVAL HPI/OVERNIGHT EVENTS: Pt seen and examined at bedside, in NAD. Denies N/V/D, abdominal pain, CP, SOB, palpitations, fevers.  Pt much more alert.  Over 1 liter in NG aspirate  Abdom improved but still distended ewithfullness in LLQ  Pt wants NGT out and threatens to pull it out.    Colonoscopy cancelled due to stool in the rectal vault    REVIEW OF SYSTEMS:  CONSTITUTIONAL: No fever, weight loss, or fatigue  EYES: No eye pain, visual disturbances, or discharge  ENMT:  No difficulty hearing, tinnitus, vertigo; No sinus or throat pain  NECK: No pain or stiffness  BREASTS: No pain, no masses,   RESPIRATORY: No cough, wheezing, chills or hemoptysis; No shortness of breath  CARDIOVASCULAR: No chest pain, palpitations, dizziness, or leg swelling  GASTROINTESTINAL: No abdominal or epigastric pain. No nausea, vomiting, or hematemesis; No diarrhea or constipation. No melena or hematochezia.  GENITOURINARY: No dysuria, frequency, hematuria, or incontinence  NEUROLOGICAL: No headaches, memory loss, loss of strength, numbness, or tremors  SKIN: No itching, burning, rashes, or lesions   LYMPH NODES: No enlarged glands  MUSCULOSKELETAL: No joint pain or swelling; No muscle, back, or extremity pain  PSYCHIATRIC: No depression, anxiety, mood swings, or difficulty sleeping  ALLERY No hives or eczema    PHYSICAL EXAM:  GENERAL: NAD, well-groomed, well-developed  HEAD:  Atraumatic, Normocephalic  EYES: EOMI, PERRLA, conjunctiva and sclera clear  ENMT: No tonsillar erythema, exudates, or enlargement; Moist mucous membranes, Good dentition, No lesions  NECK: Supple, No JVD, Normal thyroid  NERVOUS SYSTEM:  Alert & Oriented X3, Good concentration; Motor Strength 5/5 B/L upper and lower extremities; DTRs 2+ intact and symmetric  CHEST/LUNG: Clear to percussion bilaterally; No rales, rhonchi, wheezing, or rubs  HEART: Regular rate and rhythm; No murmurs, rubs, or gallops  ABDOMEN:see above  EXTREMITIES:  2+ Peripheral Pulses, No clubbing, cyanosis, or edema  LYMPH: No lymphadenopathy noted  SKIN: No rashes or lesions    Vital Signs Last 24 Hrs  T(C): 36.7 (11 May 2018 14:22), Max: 36.7 (11 May 2018 12:01)  T(F): 98.1 (11 May 2018 14:22), Max: 98.1 (11 May 2018 14:22)  HR: 114 (11 May 2018 14:22) (107 - 114)  BP: 115/75 (11 May 2018 14:22) (114/64 - 129/63)  BP(mean): --  RR: 16 (11 May 2018 14:22) (16 - 19)  SpO2: 100% (11 May 2018 10:10) (100% - 100%)        MEDICATIONS  (STANDING):  ALBUTerol/ipratropium for Nebulization 3 milliLiter(s) Nebulizer every 6 hours  carBAMazepine 200 milliGRAM(s) Oral two times a day  clopidogrel Tablet 75 milliGRAM(s) Oral daily  docusate sodium 100 milliGRAM(s) Oral daily  gabapentin Oral Tab/Cap - Peds 300 milliGRAM(s) Oral two times a day  heparin  Injectable 5000 Unit(s) SubCutaneous every 12 hours  lactobacillus acidophilus 1 Tablet(s) Oral daily  levETIRAcetam  IVPB 375 milliGRAM(s) IV Intermittent every 12 hours  metoprolol tartrate 50 milliGRAM(s) Oral two times a day  montelukast 10 milliGRAM(s) Oral daily  pantoprazole    Tablet 40 milliGRAM(s) Oral before breakfast  piperacillin/tazobactam IVPB. 3.375 Gram(s) IV Intermittent every 8 hours  sodium chloride 0.9%. 1000 milliLiter(s) (75 mL/Hr) IV Continuous <Continuous>    MEDICATIONS  (PRN):  acetaminophen   Tablet. 650 milliGRAM(s) Oral every 6 hours PRN Moderate Pain (4 - 6)  metoprolol tartrate Injectable 5 milliGRAM(s) IV Push every 6 hours PRN elevated HR  ondansetron Injectable 4 milliGRAM(s) IV Push every 6 hours PRN Nausea and/or Vomiting  temazepam 15 milliGRAM(s) Oral at bedtime PRN Insomnia      LABS:                 146  |  101  |  11  ----------------------------<  75  3.2<L>   |  18  |  0.6<L>    Ca    8.1<L>      09 May 2018 20:46  Mg     2.3     05-09    TPro  4.8<L>  /  Alb  2.3<L>  /  TBili  <0.2  /  DBili  x   /  AST  11  /  ALT  <5  /  AlkPhos  78  05-09    LIVER FUNCTIONS - ( 09 May 2018 20:46 )  Alb: 2.3 g/dL / Pro: 4.8 g/dL / ALK PHOS: 78 U/L / ALT: <5 U/L / AST: 11 U/L / GGT: x

## 2018-05-12 NOTE — PROGRESS NOTE ADULT - SUBJECTIVE AND OBJECTIVE BOX
KARINA CUNNINGHAM  82y  Female  HPI:  82 years old female coming fro [facility] c/o distended abdomen and coffe ground vomit for 2 days. (07 May 2018 23:32)    MEDICATIONS  (STANDING):  ALBUTerol/ipratropium for Nebulization 3 milliLiter(s) Nebulizer every 6 hours  carBAMazepine 200 milliGRAM(s) Oral two times a day  clopidogrel Tablet 75 milliGRAM(s) Oral daily  docusate sodium 100 milliGRAM(s) Oral daily  fentaNYL   Patch  75 MICROgram(s)/Hr 1 Patch Transdermal every 72 hours  gabapentin Oral Tab/Cap - Peds 300 milliGRAM(s) Oral two times a day  heparin  Injectable 5000 Unit(s) SubCutaneous every 12 hours  lactobacillus acidophilus 1 Tablet(s) Oral daily  levETIRAcetam  IVPB 375 milliGRAM(s) IV Intermittent every 12 hours  metoprolol tartrate 50 milliGRAM(s) Oral two times a day  montelukast 10 milliGRAM(s) Oral daily  pantoprazole    Tablet 40 milliGRAM(s) Oral before breakfast  piperacillin/tazobactam IVPB. 3.375 Gram(s) IV Intermittent every 8 hours  sodium chloride 0.9%. 1000 milliLiter(s) (75 mL/Hr) IV Continuous <Continuous>    MEDICATIONS  (PRN):  acetaminophen   Tablet. 650 milliGRAM(s) Oral every 6 hours PRN Moderate Pain (4 - 6)  metoprolol tartrate Injectable 5 milliGRAM(s) IV Push every 6 hours PRN elevated HR  ondansetron Injectable 4 milliGRAM(s) IV Push every 6 hours PRN Nausea and/or Vomiting  temazepam 15 milliGRAM(s) Oral at bedtime PRN Insomnia  zolpidem 5 milliGRAM(s) Enteral Tube at bedtime PRN Insomnia    INTERVAL EVENTS: Chart reviewed, case discussed with nurse. Patient with NGT, uncomfortable. Patients colonoscopy cancelled yesterday due to poor prep.    T(C): 36.8 (05-12-18 @ 14:00), Max: 36.9 (05-12-18 @ 06:00)  HR: 112 (05-12-18 @ 14:00) (99 - 114)  BP: 122/63 (05-12-18 @ 14:00) (122/63 - 138/73)  RR: 18 (05-12-18 @ 14:00) (18 - 19)  SpO2: 96% (05-11-18 @ 21:02) (96% - 96%)  Wt(kg): --Vital Signs Last 24 Hrs  T(C): 36.8 (12 May 2018 14:00), Max: 36.9 (12 May 2018 06:00)  T(F): 98.3 (12 May 2018 14:00), Max: 98.4 (12 May 2018 06:00)  HR: 112 (12 May 2018 14:00) (99 - 114)  BP: 122/63 (12 May 2018 14:00) (122/63 - 138/73)  BP(mean): --  RR: 18 (12 May 2018 14:00) (18 - 19)  SpO2: 96% (11 May 2018 21:02) (96% - 96%)    PHYSICAL EXAM:  GENERAL: pale, chronically ill appearing, NGT in place  NECK: Supple, No JVD  CHEST/LUNG: Clear?  HEART: S1, S2, Regular rate and rhythm;   ABDOMEN: Soft, tender, distended  EXTREMITIES: Trace edema, PICC line      LABS:  Labs:                        8.8    11.79 )-----------( 535      ( 12 May 2018 11:41 )             29.4             05-12    147<H>  |  106  |  5<L>  ----------------------------<  72  4.1   |  18  |  0.5<L>    Ca    7.8<L>      12 May 2018 11:41  Mg     2.0     05-12        RADIOLOGY & ADDITIONAL TESTS:  < from: Xray Abdomen 1 View Portable, IMMEDIATE (05.09.18 @ 08:41) >  There is an enteric tube which is in satisfactory position.   Redemonstration of stable distended small and large bowels which are   air-filled. There is paucity of bowel gas shadow in the pelvis. Multiple   phleboliths in pelvis.      < end of copied text >    < from: CT Abdomen and Pelvis w/ IV Cont (05.07.18 @ 18:34) >  1. Diffuse abnormal dilation of large (up to 7 cm) and small bowel (up to   3.6 cm) with focal caliber change at distal descending colon; further   evaluation with colonoscopy recommended to exclude underlying obstructive   lesion.    2. Mucus plugging and/or debris within the visualized bronchioles with   bilateral lung base airspace opacity; correlate for aspiration with   aspiration pneumonia.    3. Liver segment 4 indeterminate lesion measuring 3.5 cm. Gallbladder   nondependent hyperdense material or mass. Further evaluation with   outpatient MRI abdomen and pelvis with and without IV contrast   recommended.    4. Left upper renal pole 4.2 cm indeterminate lesion measuring greater   than simple fluid density; renal proteinaceous cyst versus mass can be   differentiated on recommended MRI.      < end of copied text >

## 2018-05-12 NOTE — PROGRESS NOTE ADULT - SUBJECTIVE AND OBJECTIVE BOX
S: pt seen earlier and again; wants NGt out and does not want any further work up. NGT has been clamped    Obstructive series done and showing increased small bowel dilitation     Xray Abdomen Minimum 3 Views (05.12.18 @ 11:07) >  Impression:  Increasing small bowel dilatation. Stable distention of large bowel.    --------------------------------------------------------------------------------------------------------    Dr. Alcala and Dr. Monk made aware: will resume NGT to suction; continue IV fluids and correct electrolytes if needed; WIll also obtain nutrition consult with Dr. Mercedes for possible PPN/TPN (pt has PICC line for abx) S: pt seen earlier and again; wants NGt out and does not want any further work up. NGT has been clamped    Obstructive series done and showing increased small bowel dilitation     Xray Abdomen Minimum 3 Views (05.12.18 @ 11:07) >  Impression:  Increasing small bowel dilatation. Stable distention of large bowel.    --------------------------------------------------------------------------------------------------------    Dr. Alcala and Dr. Monk made aware: will resume NGT to suction; continue IV fluids (will increase to 100 cc/hr) and correct electrolytes if needed; WIll also obtain nutrition consult with Dr. Mercedes for possible PPN/TPN (pt has PICC line for abx). Will continue to follow

## 2018-05-12 NOTE — PROGRESS NOTE ADULT - SUBJECTIVE AND OBJECTIVE BOX
S;  Pt without any complaints of abdominal pain, N/V. NGT in place, clamped. Unable to do colonoscopy yesterday due to poor prep. States she is passing flatus    Vital Signs Last 24 Hrs  T(C): 36.9 (12 May 2018 06:00), Max: 36.9 (12 May 2018 06:00)  T(F): 98.4 (12 May 2018 06:00), Max: 98.4 (12 May 2018 06:00)  HR: 101 (12 May 2018 06:00) (99 - 114)  BP: 130/78 (12 May 2018 06:00) (114/64 - 138/73)  RR: 18 (12 May 2018 06:00) (16 - 19)  SpO2: 96% (11 May 2018 21:02) (96% - 100%)    EXAM:    Abd: soft, NT, mildly distended    LABS: pending    RADIOLOGY:    Xray Abdomen 1 View Portable, IMMEDIATE (05.09.18 @ 08:41) >  INTERPRETATION:  Clinical History / Reason for exam: Abdominal pain    Comparison: 5/8/2018    Findings/  impression:    There is an enteric tube which is in satisfactory position.   Redemonstration of stable distended small and large bowels which are   air-filled. There is paucity of bowel gas shadow in the pelvis. Multiple   phleboliths in pelvis.    Unchanged findings of osseous structures.      Obstructive series (today) : pending

## 2018-05-13 NOTE — PROGRESS NOTE ADULT - SUBJECTIVE AND OBJECTIVE BOX
HPI:  Patient stating she does not want be here. Agitated/yelling.    PAST MEDICAL & SURGICAL HISTORY:  Osteoporosis  COPD (chronic obstructive pulmonary disease)  High cholesterol  Colitis  Depression  Hypertension  Anemia      Allergies    Bactrim (Unknown)  sulfa drugs (Unknown)  tetanus immune globulin (Unknown)      MEDICATIONS  (STANDING):  ALBUTerol/ipratropium for Nebulization 3 milliLiter(s) Nebulizer every 6 hours  carBAMazepine 200 milliGRAM(s) Oral two times a day  fentaNYL   Patch  75 MICROgram(s)/Hr 1 Patch Transdermal every 72 hours  heparin  Injectable 5000 Unit(s) SubCutaneous every 12 hours  levETIRAcetam  IVPB 375 milliGRAM(s) IV Intermittent every 12 hours  piperacillin/tazobactam IVPB. 3.375 Gram(s) IV Intermittent every 8 hours  sodium chloride 0.9%. 1000 milliLiter(s) (100 mL/Hr) IV Continuous <Continuous>    MEDICATIONS  (PRN):  acetaminophen   Tablet. 650 milliGRAM(s) Oral every 6 hours PRN Moderate Pain (4 - 6)  acetaminophen  Suppository. 650 milliGRAM(s) Rectal every 8 hours PRN Mild Pain (1 - 3)  metoprolol tartrate Injectable 5 milliGRAM(s) IV Push every 6 hours PRN elevated HR  ondansetron Injectable 4 milliGRAM(s) IV Push every 6 hours PRN Nausea and/or Vomiting  zolpidem 5 milliGRAM(s) Enteral Tube at bedtime PRN Insomnia      Vital Signs Last 24 Hrs  T(F): 96.1 (13 May 2018 06:00), Max: 98.3 (12 May 2018 14:00)  HR: 117 (13 May 2018 06:00) (104 - 121)  BP: 94/51 (13 May 2018 06:00) (90/50 - 122/63)  RR: 18 (13 May 2018 06:00) (16 - 18)  SpO2: 94% (13 May 2018 03:46) (94% - 94%)    PHYSICAL EXAM:      Constitutional: alert, NGT in place to suction  Gastrointestinal: soft, distended + hypoactive bs, no tenderness rebound or guarding                                8.8    11.79 )-----------( 535      ( 12 May 2018 11:41 )             29.4       05-12    147<H>  |  106  |  5<L>  ----------------------------<  72  4.1   |  18  |  0.5<L>    Ca    7.8<L>      12 May 2018 11:41  Mg     2.0     05-12    < from: CT Abdomen and Pelvis w/ IV Cont (05.07.18 @ 18:34) >  1. Diffuse abnormal dilation of large (up to 7 cm) and small bowel (up to   3.6 cm) with focal caliber change at distal descending colon; further   evaluation with colonoscopy recommended to exclude underlying obstructive   lesion.    2. Mucus plugging and/or debris within the visualized bronchioles with   bilateral lung base airspace opacity; correlate for aspiration with   aspiration pneumonia.    3. Liver segment 4 indeterminate lesion measuring 3.5 cm. Gallbladder   nondependent hyperdense material or mass. Further evaluation with   outpatient MRI abdomen and pelvis with and without IV contrast   recommended.    4. Left upper renal pole 4.2 cm indeterminate lesion measuring greater   than simple fluid density; renal proteinaceous cyst versus mass can be   differentiated on recommended MRI.      < from: Xray Abdomen Minimum 3 Views (05.12.18 @ 11:07) >  Impression:  Increasing small bowel dilatation. Stable distention of large bowel.

## 2018-05-13 NOTE — PROGRESS NOTE ADULT - SUBJECTIVE AND OBJECTIVE BOX
Patient is a 82y old  Female who presents with a chief complaint of     HPI:  82 years old female coming fro [facility] c/o distended abdomen and coffe ground vomit for 2 days. (07 May 2018 23:32)      INTERVAL HPI/OVERNIGHT EVENTS: Pt seen and examined at bedside, in NAD. Denies N/V/D, abdominal pain, CP, SOB, palpitations, fevers.  Pt much more alert.  Over 1 liter in NG aspirate  Abdom improved but still distended ewithfullness in LLQ  Pt wants NGT out and threatens to pull it out.    Patient with distended abdomen.  Unable to prep    REVIEW OF SYSTEMS:  CONSTITUTIONAL: No fever, weight loss, or fatigue  EYES: No eye pain, visual disturbances, or discharge  ENMT:  No difficulty hearing, tinnitus, vertigo; No sinus or throat pain  NECK: No pain or stiffness  BREASTS: No pain, no masses,   RESPIRATORY: No cough, wheezing, chills or hemoptysis; No shortness of breath  CARDIOVASCULAR: No chest pain, palpitations, dizziness, or leg swelling  GASTROINTESTINAL: No abdominal or epigastric pain. No nausea, vomiting, or hematemesis; No diarrhea or constipation. No melena or hematochezia.  GENITOURINARY: No dysuria, frequency, hematuria, or incontinence  NEUROLOGICAL: No headaches, memory loss, loss of strength, numbness, or tremors  SKIN: No itching, burning, rashes, or lesions   LYMPH NODES: No enlarged glands  MUSCULOSKELETAL: No joint pain or swelling; No muscle, back, or extremity pain  PSYCHIATRIC: No depression, anxiety, mood swings, or difficulty sleeping  ALLERY No hives or eczema    PHYSICAL EXAM:  GENERAL: NAD, well-groomed, well-developed  HEAD:  Atraumatic, Normocephalic  EYES: EOMI, PERRLA, conjunctiva and sclera clear  ENMT: No tonsillar erythema, exudates, or enlargement; Moist mucous membranes, Good dentition, No lesions  NECK: Supple, No JVD, Normal thyroid  NERVOUS SYSTEM:  Alert & Oriented X3, Good concentration; Motor Strength 5/5 B/L upper and lower extremities; DTRs 2+ intact and symmetric  CHEST/LUNG: Clear to percussion bilaterally; No rales, rhonchi, wheezing, or rubs  HEART: Regular rate and rhythm; No murmurs, rubs, or gallops  ABDOMEN:see above  EXTREMITIES:  2+ Peripheral Pulses, No clubbing, cyanosis, or edema  LYMPH: No lymphadenopathy noted  SKIN: No rashes or lesions    Vital Signs Last 24 Hrs  T(C): 35.6 (13 May 2018 06:00), Max: 36.8 (12 May 2018 14:00)  T(F): 96.1 (13 May 2018 06:00), Max: 98.3 (12 May 2018 14:00)  HR: 117 (13 May 2018 06:00) (104 - 121)  BP: 94/51 (13 May 2018 06:00) (90/50 - 122/63)  BP(mean): --  RR: 18 (13 May 2018 06:00) (16 - 18)  SpO2: 94% (13 May 2018 03:46) (94% - 94%)        MEDICATIONS  (STANDING):  ALBUTerol/ipratropium for Nebulization 3 milliLiter(s) Nebulizer every 6 hours  carBAMazepine 200 milliGRAM(s) Oral two times a day  clopidogrel Tablet 75 milliGRAM(s) Oral daily  docusate sodium 100 milliGRAM(s) Oral daily  gabapentin Oral Tab/Cap - Peds 300 milliGRAM(s) Oral two times a day  heparin  Injectable 5000 Unit(s) SubCutaneous every 12 hours  lactobacillus acidophilus 1 Tablet(s) Oral daily  levETIRAcetam  IVPB 375 milliGRAM(s) IV Intermittent every 12 hours  metoprolol tartrate 50 milliGRAM(s) Oral two times a day  montelukast 10 milliGRAM(s) Oral daily  pantoprazole    Tablet 40 milliGRAM(s) Oral before breakfast  piperacillin/tazobactam IVPB. 3.375 Gram(s) IV Intermittent every 8 hours  sodium chloride 0.9%. 1000 milliLiter(s) (75 mL/Hr) IV Continuous <Continuous>    MEDICATIONS  (PRN):  acetaminophen   Tablet. 650 milliGRAM(s) Oral every 6 hours PRN Moderate Pain (4 - 6)  metoprolol tartrate Injectable 5 milliGRAM(s) IV Push every 6 hours PRN elevated HR  ondansetron Injectable 4 milliGRAM(s) IV Push every 6 hours PRN Nausea and/or Vomiting  temazepam 15 milliGRAM(s) Oral at bedtime PRN Insomnia      LABS:                          9.1    11.01 )-----------( 565      ( 13 May 2018 07:26 )             30.7     05-13    155<H>  |  109  |  4<L>  ----------------------------<  80  3.8   |  20  |  0.5<L>    Ca    8.0<L>      13 May 2018 07:26  Mg     2.1     05-13

## 2018-05-13 NOTE — PROGRESS NOTE ADULT - SUBJECTIVE AND OBJECTIVE BOX
KARINA CUNNINGHAM  82y  Female  HPI:  82 years old female coming fro [facility] c/o distended abdomen and coffe ground vomit for 2 days. (07 May 2018 23:32)    MEDICATIONS  (STANDING):  ALBUTerol/ipratropium for Nebulization 3 milliLiter(s) Nebulizer every 6 hours  carBAMazepine 200 milliGRAM(s) Oral two times a day  dextrose 5% + sodium chloride 0.45%. 1000 milliLiter(s) (100 mL/Hr) IV Continuous <Continuous>  fentaNYL   Patch  75 MICROgram(s)/Hr 1 Patch Transdermal every 72 hours  heparin  Injectable 5000 Unit(s) SubCutaneous every 12 hours  levETIRAcetam  IVPB 375 milliGRAM(s) IV Intermittent every 12 hours  piperacillin/tazobactam IVPB. 3.375 Gram(s) IV Intermittent every 8 hours  sodium chloride 0.9% Bolus 1000 milliLiter(s) IV Bolus once    MEDICATIONS  (PRN):  acetaminophen   Tablet. 650 milliGRAM(s) Oral every 6 hours PRN Moderate Pain (4 - 6)  acetaminophen  Suppository. 650 milliGRAM(s) Rectal every 8 hours PRN Mild Pain (1 - 3)  metoprolol tartrate Injectable 5 milliGRAM(s) IV Push every 6 hours PRN elevated HR  morphine  - Injectable 2 milliGRAM(s) IV Push every 3 hours PRN Severe Pain (7 - 10)  ondansetron Injectable 4 milliGRAM(s) IV Push every 6 hours PRN Nausea and/or Vomiting  zolpidem 5 milliGRAM(s) Enteral Tube at bedtime PRN Insomnia    INTERVAL EVENTS: Patient seen today remains uncomfortable., NGT in place, abdomen remains distended.    T(C): 36.3 (05-13-18 @ 12:28), Max: 36.8 (05-12-18 @ 14:00)  HR: 115 (05-13-18 @ 12:28) (104 - 121)  BP: 109/58 (05-13-18 @ 12:28) (90/50 - 122/63)  RR: 18 (05-13-18 @ 12:28) (16 - 18)  SpO2: 94% (05-13-18 @ 03:46) (94% - 94%)  Wt(kg): --Vital Signs Last 24 Hrs  T(C): 36.3 (13 May 2018 12:28), Max: 36.8 (12 May 2018 14:00)  T(F): 97.3 (13 May 2018 12:28), Max: 98.3 (12 May 2018 14:00)  HR: 115 (13 May 2018 12:28) (104 - 121)  BP: 109/58 (13 May 2018 12:28) (90/50 - 122/63)  BP(mean): --  RR: 18 (13 May 2018 12:28) (16 - 18)  SpO2: 94% (13 May 2018 03:46) (94% - 94%)    PHYSICAL EXAM:  GENERAL: Appears chronically ll  NECK: Supple, No JVD  CHEST/LUNG: Decreased BS  HEART: S1, S2, Regular rate and rhythm;   ABDOMEN: Soft, tender, distended; Bowel sounds decreased  EXTREMITIES: + edema    LABS:  Labs:                        9.1    11.01 )-----------( 565      ( 13 May 2018 07:26 )             30.7             05-13    155<H>  |  109  |  4<L>  ----------------------------<  80  3.8   |  20  |  0.5<L>    Ca    8.0<L>      13 May 2018 07:26  Mg     2.1     05-13                RADIOLOGY & ADDITIONAL TESTS:  < from: Xray Abdomen Minimum 3 Views (05.12.18 @ 11:07) >  Impression:  Increasing small bowel dilatation. Stable distention of large bowel.        < end of copied text >

## 2018-05-13 NOTE — CHART NOTE - NSCHARTNOTEFT_GEN_A_CORE
Registered Dietitian limited note:      Pt from SNF 2/2 abdominal distention with coffee ground emesis, persistent temps. Admitted with aspiration PNA, sepsis with leukocytosis and intestinal obstruction. NGT to suction. 5/9 s/p PICC line. NST consult pending(5/13)  for PN 2/2 unresolving obstruction. Please defer to their notes.

## 2018-05-13 NOTE — CHART NOTE - NSCHARTNOTEFT_GEN_A_CORE
sodium is 155 today will change ivf to d51/2ns  monitor sodium level  medical attending to f/u  spoke with Dr Monk and DR Alcala, patient with persistent obstruction, may require surgery if medically clear and patient/family agreeable, called patients son no answer and LM. Vital Signs Last 24 Hrs  T(C): 36.3 (13 May 2018 12:28), Max: 36.8 (12 May 2018 14:00)  T(F): 97.3 (13 May 2018 12:28), Max: 98.3 (12 May 2018 14:00)  HR: 115 (13 May 2018 12:28) (104 - 121)  BP: 109/58 (13 May 2018 12:28) (90/50 - 122/63)  BP(mean): --  RR: 18 (13 May 2018 12:28) (16 - 18)  SpO2: 94% (13 May 2018 03:46) (94% - 94%)    sodium is 155 today   uo 700cc for the shift  ngt 600cc out  monitor sodium level  medical attending to f/u  will change ivf to d51/2ns  bolus ns 1000cc as tachy, low bp and high ng output  add morphine for pain control pt pain not controlled d/w pt son and plan discussed as well  spoke with Dr Monk and DR Alcala, patient with persistent obstruction, may require surgery if medically clear and patient/family agreeable, called patients son no answer and LM. Vital Signs Last 24 Hrs  T(C): 36.3 (13 May 2018 12:28), Max: 36.8 (12 May 2018 14:00)  T(F): 97.3 (13 May 2018 12:28), Max: 98.3 (12 May 2018 14:00)  HR: 115 (13 May 2018 12:28) (104 - 121)  BP: 109/58 (13 May 2018 12:28) (90/50 - 122/63)  BP(mean): --  RR: 18 (13 May 2018 12:28) (16 - 18)  SpO2: 94% (13 May 2018 03:46) (94% - 94%)    sodium is 155 today   uo 700cc for the shift  ngt 600cc out  monitor sodium level  medical attending to f/u  will change ivf to d51/2ns  bolus ns 1000cc as tachy, low bp and high ng output  add morphine for pain control pt pain not controlled d/w pt son and plan discussed as well  spoke with Dr Monk and DR Alcala, patient with persistent obstruction, may require surgery if medically clear and patient/family agreeable, called patients son no answer and LM.  Preop eval with echo and cardiology consult Vital Signs Last 24 Hrs  T(C): 36.3 (13 May 2018 12:28), Max: 36.8 (12 May 2018 14:00)  T(F): 97.3 (13 May 2018 12:28), Max: 98.3 (12 May 2018 14:00)  HR: 115 (13 May 2018 12:28) (104 - 121)  BP: 109/58 (13 May 2018 12:28) (90/50 - 122/63)  BP(mean): --  RR: 18 (13 May 2018 12:28) (16 - 18)  SpO2: 94% (13 May 2018 03:46) (94% - 94%)    sodium is 155 today   uo 700cc for the shift  ngt 600cc out  monitor sodium level  medical attending to f/u  will change ivf to d51/2ns  bolus ns 1000cc as tachy, low bp and high ng output  add morphine for pain control pt pain not controlled d/w pt son and plan discussed as well  spoke with Dr Monk and DR Alcala, patient with persistent obstruction, may require surgery if medically clear and patient/family agreeable,   Preop eval with echo and cardiology consult

## 2018-05-14 NOTE — CONSULT NOTE ADULT - ASSESSMENT
Hypernatremia  Hypokalemia  Hx of HTN with borderline low bp's  Colon obstruction due to colon mass    plan:    change to D5W at 100 cc/hr  if overt hypotension, give NS 500cc bolus  hold hydralazine  cont lopressor  kcl 20meq iv two doses  f/u cmp, mg, phos  will follow

## 2018-05-14 NOTE — PROGRESS NOTE ADULT - SUBJECTIVE AND OBJECTIVE BOX
DIAGNOSIS:   HOSPITAL DAY #:    STATUS POST:    POST OPERATIVE DAY #:     Vital Signs Last 24 Hrs  T(C): 35.8 (14 May 2018 05:30), Max: 36.4 (13 May 2018 14:09)  T(F): 96.4 (14 May 2018 05:30), Max: 97.5 (13 May 2018 14:09)  HR: 105 (14 May 2018 05:30) (105 - 123)  BP: 105/62 (14 May 2018 05:30) (94/53 - 113/70)  BP(mean): --  RR: 16 (14 May 2018 05:30) (16 - 18)  SpO2: 95% (14 May 2018 08:30) (95% - 95%)    SUBJECTIVE: Pt seen    Pain: YES  [ ]   NO [ ]   Nausea: [ ] YES [ ] NO           Vomiting: [ ] YES [ ] NO  Flatus: [ ] YES [ ] NO             Bowel Movement: [ ] YES [ ] NO     Void: [ ]YES [ ]No      DRU DRAINAGE: SIGNIFICANT [ ]   NOT SIGNIFICANT [ ]   NOT APPLICABLE [ ]  YES [ ] NO    General Appearance: Appears well, NAD  Neck: Supple  Chest: Equal expansion bilaterally, equal breath sounds  CV: Pulse regular presently  Abdomen: Soft [x ] YES [ ]NO  DISTENDED [x ] YES [ ] NO TENDERNESS [ ]YES [x ]NO  INCISIONS: HEALING WELL [ ] YES  [ ] NO ERYTHEMA [ ] YES [ ] NO DRAINAGE [ ] YES  [ ] NO  Extremities: Grossly symmetric, CALF TENDERNESS [ ] YES  [ ] NO  ct scan noted    LABS:                        8.6    11.38 )-----------( 485      ( 14 May 2018 09:25 )             29.1     05-14    152<H>  |  106  |  <3<L>  ----------------------------<  139<H>  3.6   |  25  |  0.5<L>    Ca    7.7<L>      14 May 2018 09:25  Phos  1.9     05-14  Mg     2.2     05-14      PT/INR - ( 13 May 2018 21:47 )   PT: 16.70 sec;   INR: 1.53 ratio         PTT - ( 13 May 2018 21:47 )  PTT:31.4 sec        ASSESSMENT:     GOOD POST OP COURSE [ ]  YES  [ ] NO  CONDITION IMPROVING  []  YES  [ ]  NO          PLAN: needs mrcp and colonoscopy    CONTINUE PRESENT MANAGEMENT  [x ] YES  [ ] NO

## 2018-05-14 NOTE — PROGRESS NOTE ADULT - SUBJECTIVE AND OBJECTIVE BOX
Patient is a 82y old  Female who presents with a chief complaint of     HPI:  82 years old female coming fro [facility] c/o distended abdomen and coffe ground vomit for 2 days. (07 May 2018 23:32)      INTERVAL HPI/OVERNIGHT EVENTS: Pt seen and examined at bedside, in NAD. Denies N/V/D, abdominal pain, CP, SOB, palpitations, fevers.  Pt much more alert.  Over 1 liter in NG aspirate  Abdom improved but still distended ewithfullness in LLQ  Pt wants NGT out and threatens to pull it out.    Patient with distended abdomen.  Unable to prep.  Still has stool in the colon    REVIEW OF SYSTEMS:  CONSTITUTIONAL: No fever, weight loss, or fatigue  EYES: No eye pain, visual disturbances, or discharge  ENMT:  No difficulty hearing, tinnitus, vertigo; No sinus or throat pain  NECK: No pain or stiffness  BREASTS: No pain, no masses,   RESPIRATORY: No cough, wheezing, chills or hemoptysis; No shortness of breath  CARDIOVASCULAR: No chest pain, palpitations, dizziness, or leg swelling  GASTROINTESTINAL: No abdominal or epigastric pain. No hematemesis; No diarrhea. No melena or hematochezia.  GENITOURINARY: No dysuria, frequency, hematuria, or incontinence  NEUROLOGICAL: No headaches, memory loss, loss of strength, numbness, or tremors  SKIN: No itching, burning, rashes, or lesions   LYMPH NODES: No enlarged glands  MUSCULOSKELETAL: No joint pain or swelling; No muscle, back, or extremity pain  PSYCHIATRIC: No depression, anxiety, mood swings, or difficulty sleeping  ALLERY No hives or eczema    PHYSICAL EXAM:  GENERAL: NAD, well-groomed, well-developed  HEAD:  Atraumatic, Normocephalic  EYES: EOMI, PERRLA, conjunctiva and sclera clear  ENMT: No tonsillar erythema, exudates, or enlargement; Moist mucous membranes, Good dentition, No lesions  NECK: Supple, No JVD, Normal thyroid  NERVOUS SYSTEM:  Alert & Oriented X3, Good concentration; Motor Strength 5/5 B/L upper and lower extremities; DTRs 2+ intact and symmetric  CHEST/LUNG: Clear to percussion bilaterally; No rales, rhonchi, wheezing, or rubs  HEART: Regular rate and rhythm; No murmurs, rubs, or gallops  ABDOMEN:  soft, distended  EXTREMITIES:  2+ Peripheral Pulses, No clubbing, cyanosis, or edema  LYMPH: No lymphadenopathy noted  SKIN: No rashes or lesions    Vital Signs Last 24 Hrs  T(C): 35.8 (14 May 2018 05:30), Max: 36.4 (13 May 2018 14:09)  T(F): 96.4 (14 May 2018 05:30), Max: 97.5 (13 May 2018 14:09)  HR: 105 (14 May 2018 05:30) (105 - 123)  BP: 105/62 (14 May 2018 05:30) (94/53 - 113/70)  BP(mean): --  RR: 16 (14 May 2018 05:30) (16 - 18)  SpO2: 95% (14 May 2018 08:30) (95% - 95%)      MEDICATIONS  (STANDING):  ALBUTerol/ipratropium for Nebulization 3 milliLiter(s) Nebulizer every 6 hours  carBAMazepine 200 milliGRAM(s) Oral two times a day  clopidogrel Tablet 75 milliGRAM(s) Oral daily  docusate sodium 100 milliGRAM(s) Oral daily  gabapentin Oral Tab/Cap - Peds 300 milliGRAM(s) Oral two times a day  heparin  Injectable 5000 Unit(s) SubCutaneous every 12 hours  lactobacillus acidophilus 1 Tablet(s) Oral daily  levETIRAcetam  IVPB 375 milliGRAM(s) IV Intermittent every 12 hours  metoprolol tartrate 50 milliGRAM(s) Oral two times a day  montelukast 10 milliGRAM(s) Oral daily  pantoprazole    Tablet 40 milliGRAM(s) Oral before breakfast  piperacillin/tazobactam IVPB. 3.375 Gram(s) IV Intermittent every 8 hours  sodium chloride 0.9%. 1000 milliLiter(s) (75 mL/Hr) IV Continuous <Continuous>    MEDICATIONS  (PRN):  acetaminophen   Tablet. 650 milliGRAM(s) Oral every 6 hours PRN Moderate Pain (4 - 6)  metoprolol tartrate Injectable 5 milliGRAM(s) IV Push every 6 hours PRN elevated HR  ondansetron Injectable 4 milliGRAM(s) IV Push every 6 hours PRN Nausea and/or Vomiting  temazepam 15 milliGRAM(s) Oral at bedtime PRN Insomnia      LABS:                            8.6    11.38 )-----------( 485      ( 14 May 2018 09:25 )             29.1     05-13    155<H>  |  109  |  <3<L>  ----------------------------<  108<H>  3.3<L>   |  19  |  0.5<L>    Ca    7.6<L>      13 May 2018 21:47  Phos  1.7     05-13  Mg     2.1     05-13        PT/INR - ( 13 May 2018 21:47 )   PT: 16.70 sec;   INR: 1.53 ratio         PTT - ( 13 May 2018 21:47 )  PTT:31.4 sec    Unable to access PAC

## 2018-05-14 NOTE — CONSULT NOTE ADULT - SUBJECTIVE AND OBJECTIVE BOX
CARDIOLOGY CONSULT NOTE     CHIEF COMPLAINT/REASON FOR CONSULT:    HPI:  82 years old female coming fro [facility] c/o distended abdomen and coffe ground vomit for 2 days. (07 May 2018 23:32)      PAST MEDICAL & SURGICAL HISTORY:  Osteoporosis  COPD (chronic obstructive pulmonary disease)  High cholesterol  Colitis  Depression  Hypertension  Anemia      Cardiac Risks:   [x ]HTN, [ ] DM, [ ] Smoking, [ ] FH,  [x ] Lipids        MEDICATIONS:  MEDICATIONS  (STANDING):  ALBUTerol/ipratropium for Nebulization 3 milliLiter(s) Nebulizer every 6 hours  carBAMazepine 200 milliGRAM(s) Oral two times a day  dextrose 5% + sodium chloride 0.45%. 1000 milliLiter(s) (100 mL/Hr) IV Continuous <Continuous>  fentaNYL   Patch  75 MICROgram(s)/Hr 1 Patch Transdermal every 72 hours  heparin  Injectable 5000 Unit(s) SubCutaneous every 12 hours  levETIRAcetam  IVPB 375 milliGRAM(s) IV Intermittent every 12 hours  piperacillin/tazobactam IVPB. 3.375 Gram(s) IV Intermittent every 8 hours  potassium chloride  20 mEq/100 mL IVPB 20 milliEquivalent(s) IV Intermittent once      FAMILY HISTORY:      SOCIAL HISTORY:      [ ] Marital status Divourced  Allergies    Bactrim (Unknown)  sulfa drugs (Unknown)  tetanus immune globulin (Unknown)      	    REVIEW OF SYSTEMS:  CONSTITUTIONAL: No fever, weight loss, or fatigue  EYES: No eye pain, visual disturbances, or discharge  ENMT:  No difficulty hearing, tinnitus, vertigo; No sinus or throat pain  NECK: No pain or stiffness  RESPIRATORY: No cough, wheezing, chills or hemoptysis; No Shortness of Breath  CARDIOVASCULAR: No chest pain, palpitations, passing out, dizziness, or leg swelling  GASTROINTESTINAL: No abdominal or epigastric pain. No nausea, vomiting, or hematemesis; No diarrhea or constipation. No melena or hematochezia.  GENITOURINARY: No dysuria, frequency, hematuria, or incontinence  NEUROLOGICAL: No headaches, memory loss, loss of strength, numbness, or tremors  SKIN: No itching, burning, rashes, or lesions   	      PHYSICAL EXAM:  T(C): 35.8 (18 @ 05:30), Max: 36.4 (18 @ 14:09)  HR: 105 (18 @ 05:30) (105 - 123)  BP: 105/62 (18 @ 05:30) (94/53 - 113/70)  RR: 16 (18 @ 05:30) (16 - 18)  SpO2: 95% (18 @ 08:30) (95% - 95%)  Wt(kg): --  I&O's Summary    13 May 2018 07:01  -  14 May 2018 07:00  --------------------------------------------------------  IN: 2085 mL / OUT: 4175 mL / NET: -2090 mL        Appearance: Normal	  Psychiatry: A & O x 3, Mood & affect appropriate  HEENT:   Normal oral mucosa, PERRL, EOMI	  Lymphatic: No lymphadenopathy  Cardiovascular: Normal S1 S2,RRR, No JVD, No murmurs  Respiratory: Lungs clear to auscultation	  Gastrointestinal:  Soft, Non-tender, + BS	  Skin: No rashes, No ecchymoses, No cyanosis	  Neurologic: Non-focal  Extremities: Normal range of motion, No clubbing, cyanosis or edema  Vascular: Peripheral pulses palpable 2+ bilaterally      EC/7 ST PRWP NSST    	  LABS:	 	    CARDIAC MARKERS:                                    9.1    11.01 )-----------( 565      ( 13 May 2018 07:26 )             30.7     05-13    155<H>  |  109  |  <3<L>  ----------------------------<  108<H>  3.3<L>   |  19  |  0.5<L>    Ca    7.6<L>      13 May 2018 21:47  Phos  1.7     -  Mg     2.1     -      PT/INR - ( 13 May 2018 21:47 )   PT: 16.70 sec;   INR: 1.53 ratio

## 2018-05-14 NOTE — CONSULT NOTE ADULT - SUBJECTIVE AND OBJECTIVE BOX
NEPHROLOGY CONSULTATION NOTE    Patient is a 82y Female whom presented to the hospital with abd distention, from SNF, being treated for colon obstruction due to likely colon mass.  Pt is npo, pulled out ngt and on ivf's with worsening hypernatremia, thus renal consulted.  PT with borderline low bp's.    PAST MEDICAL & SURGICAL HISTORY:  Osteoporosis  COPD (chronic obstructive pulmonary disease)  High cholesterol  Colitis  Depression  Hypertension  Anemia    Allergies:  Bactrim (Unknown)  sulfa drugs (Unknown)  tetanus immune globulin (Unknown)    Home Medications Reviewed    SOCIAL HISTORY:  Denies ETOH,Smoking,   FAMILY HISTORY:    Yes      REVIEW OF SYSTEMS:    All other review of systems is negative unless indicated above.    PHYSICAL EXAM:  NAD  dry mm  no jvd  cta bl  rrr  soft, nt, + bs  no brand  no edema  + left arm PICC    Hospital Medications:   MEDICATIONS  (STANDING):  ALBUTerol/ipratropium for Nebulization 3 milliLiter(s) Nebulizer every 6 hours  carBAMazepine 200 milliGRAM(s) Oral two times a day  dextrose 5% + sodium chloride 0.45%. 1000 milliLiter(s) (100 mL/Hr) IV Continuous <Continuous>  fentaNYL   Patch  75 MICROgram(s)/Hr 1 Patch Transdermal every 72 hours  heparin  Injectable 5000 Unit(s) SubCutaneous every 12 hours  levETIRAcetam  IVPB 375 milliGRAM(s) IV Intermittent every 12 hours  piperacillin/tazobactam IVPB. 3.375 Gram(s) IV Intermittent every 8 hours  potassium chloride  20 mEq/100 mL IVPB 20 milliEquivalent(s) IV Intermittent once        VITALS:  T(F): 96.4 (18 @ 05:30), Max: 97.5 (18 @ 14:09)  HR: 105 (18 @ 05:30)  BP: 105/62 (18 @ 05:30)  RR: 16 (18 @ 05:30)  SpO2: 95% (18 @ 08:30)  Wt(kg): --     @ 07:01  -   @ 07:00  --------------------------------------------------------  IN: 1100 mL / OUT: 3400 mL / NET: -2300 mL     @ 07:01  -   @ 07:00  --------------------------------------------------------  IN: 2085 mL / OUT: 4175 mL / NET: -0 mL          LABS:      155<H>  |  109  |  <3<L>  ----------------------------<  108<H>  3.3<L>   |  19  |  0.5<L>    Ca    7.6<L>      13 May 2018 21:47  Phos  1.7       Mg     2.1                                 8.6    11.38 )-----------( 485      ( 14 May 2018 09:25 )             29.1       Urine Studies:  Urinalysis Basic - ( 07 May 2018 15:00 )    Color: Yellow / Appearance: Slightly Cloudy / S.025 / pH:   Gluc:  / Ketone: Trace  / Bili: Small / Urobili: 0.2 mg/dL   Blood:  / Protein: 30 mg/dL / Nitrite: Negative   Leuk Esterase: Trace / RBC: 5-10 /HPF / WBC 6-10 /HPF   Sq Epi:  / Non Sq Epi: Occasional /HPF / Bacteria: TNTC /HPF          RADIOLOGY & ADDITIONAL STUDIES:

## 2018-05-14 NOTE — PROGRESS NOTE ADULT - SUBJECTIVE AND OBJECTIVE BOX
KARINA CUNNINGHAM  82y  Female      Patient is a 82y old  Female who presents with a chief complaint of  vomitting,distended abdomen      REVIEW OF SYSTEMS:  CONSTITUTIONAL: No fever, weight loss, or fatigue  EYES: No eye pain, visual disturbances, or discharge  ENMT:  No difficulty hearing, tinnitus, vertigo; No sinus or throat pain  NECK: No pain or stiffness  BREASTS: No pain, masses, or nipple discharge  RESPIRATORY: No cough, wheezing, chills or hemoptysis; No shortness of breath  CARDIOVASCULAR: No chest pain, palpitations, dizziness, or leg swelling  GASTROINTESTINAL: No abdominal or epigastric pain,on ng to low gumco  GENITOURINARY: No dysuria, frequency, hematuria, or incontinence  NEUROLOGICAL: No headaches, memory loss, loss of strength, numbness, or tremors  SKIN: No itching, burning, rashes, or lesions   LYMPH NODES: No enlarged glands  ENDOCRINE: No heat or cold intolerance; No hair loss  MUSCULOSKELETAL: No joint pain or swelling; No muscle, back, or extremity pain  PSYCHIATRIC: No depression, anxiety, mood swings, or difficulty sleeping  HEME/LYMPH: No easy bruising, or bleeding gums  ALLERY AND IMMUNOLOGIC: No hives or eczema  FAMILY HISTORY:    T(C): 35.8 (05-14-18 @ 05:30), Max: 36.4 (05-13-18 @ 14:09)  HR: 105 (05-14-18 @ 05:30) (105 - 123)  BP: 105/62 (05-14-18 @ 05:30) (94/53 - 113/70)  RR: 16 (05-14-18 @ 05:30) (16 - 18)  SpO2: 95% (05-13-18 @ 20:58) (95% - 95%)  Wt(kg): --Vital Signs Last 24 Hrs  T(C): 35.8 (14 May 2018 05:30), Max: 36.4 (13 May 2018 14:09)  T(F): 96.4 (14 May 2018 05:30), Max: 97.5 (13 May 2018 14:09)  HR: 105 (14 May 2018 05:30) (105 - 123)  BP: 105/62 (14 May 2018 05:30) (94/53 - 113/70)  BP(mean): --  RR: 16 (14 May 2018 05:30) (16 - 18)  SpO2: 95% (13 May 2018 20:58) (95% - 95%)  Bactrim (Unknown)  sulfa drugs (Unknown)  tetanus immune globulin (Unknown)      PHYSICAL EXAM:  GENERAL: NAD, well-groomed, well-developed  HEAD:  Atraumatic, Normocephalic  EYES: EOMI, PERRLA, conjunctiva and sclera clear  ENMT: No tonsillar erythema, exudates, or enlargement;   NECK: Supple, No JVD, Normal thyroid  NERVOUS SYSTEM:  Alert & Oriented X3,   CHEST/LUNG: Clear to percussion bilaterally; No rales, rhonchi, wheezing, or rubs  HEART: Regular rate and rhythm; No murmurs, rubs, or gallops  ABDOMEN: Soft, Nontender, Nondistended; Bowel sounds present,on ng to low gumco  EXTREMITIES:  2+ Peripheral Pulses, No clubbing, cyanosis, or edema  LYMPH: No lymphadenopathy noted  SKIN: No rashes or lesions      LABS:  05-13    155<H>  |  109  |  <3<L>  ----------------------------<  108<H>  3.3<L>   |  19  |  0.5<L>    Ca    7.6<L>      13 May 2018 21:47  Phos  1.7     05-13  Mg     2.1     05-13                          9.1    11.01 )-----------( 565      ( 13 May 2018 07:26 )             30.7           RADIOLOGY & ADDITIONAL TESTS:    MEDICATION:  acetaminophen   Tablet. 650 milliGRAM(s) Oral every 6 hours PRN  acetaminophen  Suppository. 650 milliGRAM(s) Rectal every 8 hours PRN  ALBUTerol/ipratropium for Nebulization 3 milliLiter(s) Nebulizer every 6 hours  carBAMazepine 200 milliGRAM(s) Oral two times a day  dextrose 5% + sodium chloride 0.45%. 1000 milliLiter(s) IV Continuous <Continuous>  fentaNYL   Patch  75 MICROgram(s)/Hr 1 Patch Transdermal every 72 hours  heparin  Injectable 5000 Unit(s) SubCutaneous every 12 hours  levETIRAcetam  IVPB 375 milliGRAM(s) IV Intermittent every 12 hours  metoprolol tartrate Injectable 5 milliGRAM(s) IV Push every 6 hours PRN  morphine  - Injectable 2 milliGRAM(s) IV Push every 3 hours PRN  ondansetron Injectable 4 milliGRAM(s) IV Push every 6 hours PRN  piperacillin/tazobactam IVPB. 3.375 Gram(s) IV Intermittent every 8 hours  zolpidem 5 milliGRAM(s) Enteral Tube at bedtime PRN      HEALTH ISSUES - PROBLEM Dx:  Colon obstruction: Colon obstruction on ng to low gumco  Complete intestinal obstruction, unspecified cause: Complete intestinal obstruction, unspecified cause  Colon neoplasm: Colon neoplasm  Aspiration pneumonia: Aspiration pneumonia zosyn  Hypernatremia on iv fluids  hypokelekmia replace kcl

## 2018-05-15 NOTE — CONSULT NOTE ADULT - SUBJECTIVE AND OBJECTIVE BOX
HPI:  82 years old female coming fro [facility] c/o distended abdomen and coffe ground vomit for 2 days. (07 May 2018 23:32)  Over 1 liter in NG aspirate  Abdomen exam  improved but still distended with fullness in LLQ. Pt pulled out her NGT and refuses reinsertion. pt stated she wants to go back the nursing home and does not want the ngt repalced.     PAST MEDICAL & SURGICAL HISTORY:  Osteoporosis  COPD (chronic obstructive pulmonary disease)  High cholesterol  Colitis  Depression  Hypertension  Anemia         ICU Vital Signs Last 24 Hrs  T(C): 36.4 (15 May 2018 05:30), Max: 36.7 (14 May 2018 22:22)  T(F): 97.5 (15 May 2018 05:30), Max: 98.1 (14 May 2018 22:22)  HR: 117 (15 May 2018 05:30) (115 - 117)  BP: 110/64 (15 May 2018 05:30) (106/70 - 110/64)  RR: 16 (15 May 2018 05:30) (16 - 16)  Drug Dosing Weight  Weight (kg): 88 (08 May 2018 00:02)  I&O's Detail    14 May 2018 07:01  -  15 May 2018 07:00  --------------------------------------------------------  IN:  Total IN: 0 mL    OUT:    Indwelling Catheter - Urethral: 1350 mL  Total OUT: 1350 mL    Total NET: -1350 mL            PHYSICAL EXAM:  GENERAL: NAD, well-groomed, well-developed, Alert & Oriented X3  HEENT:  mucous membranes +nasal cannula in place  ABDOMEN: distended, non tender, soft  EXTREMITIES: + edema  SKIN: No rashes or lesions  IV ACCESS: peripheral IV fluids  FEEDING ACCESS: NPO    MEDICATIONS  (STANDING):  ALBUTerol/ipratropium for Nebulization 3 milliLiter(s) Nebulizer every 6 hours  carBAMazepine 200 milliGRAM(s) Oral two times a day  dextrose 5%. 1000 milliLiter(s) (100 mL/Hr) IV Continuous <Continuous>  fentaNYL   Patch  75 MICROgram(s)/Hr 1 Patch Transdermal every 72 hours  heparin  Injectable 5000 Unit(s) SubCutaneous every 12 hours  levETIRAcetam  IVPB 375 milliGRAM(s) IV Intermittent every 12 hours    MEDICATIONS  (PRN):  acetaminophen   Tablet. 650 milliGRAM(s) Oral every 6 hours PRN Moderate Pain (4 - 6)  acetaminophen  Suppository. 650 milliGRAM(s) Rectal every 8 hours PRN Mild Pain (1 - 3)  metoprolol tartrate Injectable 5 milliGRAM(s) IV Push every 6 hours PRN elevated HR  morphine  - Injectable 2 milliGRAM(s) IV Push every 3 hours PRN Severe Pain (7 - 10)  ondansetron Injectable 4 milliGRAM(s) IV Push every 6 hours PRN Nausea and/or Vomiting  zolpidem 5 milliGRAM(s) Enteral Tube at bedtime PRN Insomnia    Allergies  Bactrim (Unknown)  sulfa drugs (Unknown)  tetanus immune globulin (Unknown)    LABS:    05-14    152<H>  |  106  |  <3<L>  ----------------------------<  139<H>  3.6   |  25  |  0.5<L>    Ca    7.7<L>      14 May 2018 09:25  Phos  1.9     05-14  Mg     2.2     05-14    PT/INR - ( 13 May 2018 21:47 )   PT: 16.70 sec;   INR: 1.53 ratio     PTT - ( 13 May 2018 21:47 )  PTT:31.4 sec      RADIOLOGY:  < from: CT Abdomen and Pelvis w/ Oral Cont and w/ IV Cont (05.14.18 @ 06:28) >    IMPRESSION:     Interval decompression of the small bowel since 5/7/2018. Persistent   large bowel obstruction with abrupt transition point in the distal   descending colon, suspicious for an underlying mass.  Stable 2.8 x 2.9 cm indeterminate hypodensity in segment 4. A metastatic   lesion cannot be excluded.  Stable central intrahepatic biliary ductal dilatation and common bile   duct dilation to 2.0 cm of uncertain etiology.   Stable irregular hyperdensity within the gallbladder lumen measuring   approximately 3.7 x 2.1 cm. a malignant gallbladder mass cannot be HPI:  82 years old female coming fro [facility] c/o distended abdomen and coffe ground vomit for 2 days. (07 May 2018 23:32)  Over 1 liter in NG aspirate  Abdomen exam  improved but still distended with fullness in LLQ. Pt pulled out her NGT and refuses reinsertion. pt stated she wants to go back the nursing home and does not want the ngt repalced.     PAST MEDICAL & SURGICAL HISTORY:  Osteoporosis  COPD (chronic obstructive pulmonary disease)  High cholesterol  Colitis  Depression  Hypertension  Anemia         ICU Vital Signs Last 24 Hrs  T(C): 36.4 (15 May 2018 05:30), Max: 36.7 (14 May 2018 22:22)  T(F): 97.5 (15 May 2018 05:30), Max: 98.1 (14 May 2018 22:22)  HR: 117 (15 May 2018 05:30) (115 - 117)  BP: 110/64 (15 May 2018 05:30) (106/70 - 110/64)  RR: 16 (15 May 2018 05:30) (16 - 16)  Drug Dosing Weight  Weight (kg): 88 (08 May 2018 00:02)  I&O's Detail    14 May 2018 07:01  -  15 May 2018 07:00  --------------------------------------------------------  IN:  Total IN: 0 mL    OUT:    Indwelling Catheter - Urethral: 1350 mL  Total OUT: 1350 mL    Total NET: -1350 mL            PHYSICAL EXAM:  GENERAL: NAD, well-groomed, well-developed, Alert & Oriented X3 when wakened and spoken to. + fentanyl patch L upper chest, and she receives prn morphine, after which she's sluggish and speech / response slower  HEENT:  mucous membranes +nasal cannula in place  ABDOMEN: distended, non tender, soft with some tension over L side. No c/o nausea or need to vomit.  EXTREMITIES: + edema  SKIN: No rashes or lesions  IV ACCESS: LUE s/l PICC - c/d/i/no erythema or leak  FEEDING ACCESS: NPO    MEDICATIONS  (STANDING):  ALBUTerol/ipratropium for Nebulization 3 milliLiter(s) Nebulizer every 6 hours  carBAMazepine 200 milliGRAM(s) Oral two times a day  dextrose 5%. 1000 milliLiter(s) (100 mL/Hr) IV Continuous <Continuous>  --  NOT CURRENTLY INFUSING  fentaNYL   Patch  75 MICROgram(s)/Hr 1 Patch Transdermal every 72 hours  heparin  Injectable 5000 Unit(s) SubCutaneous every 12 hours  levETIRAcetam  IVPB 375 milliGRAM(s) IV Intermittent every 12 hours    MEDICATIONS  (PRN):  acetaminophen   Tablet. 650 milliGRAM(s) Oral every 6 hours PRN Moderate Pain (4 - 6)  acetaminophen  Suppository. 650 milliGRAM(s) Rectal every 8 hours PRN Mild Pain (1 - 3)  metoprolol tartrate Injectable 5 milliGRAM(s) IV Push every 6 hours PRN elevated HR  morphine  - Injectable 2 milliGRAM(s) IV Push every 3 hours PRN Severe Pain (7 - 10)  ondansetron Injectable 4 milliGRAM(s) IV Push every 6 hours PRN Nausea and/or Vomiting  zolpidem 5 milliGRAM(s) Enteral Tube at bedtime PRN Insomnia    Allergies  Bactrim (Unknown)  sulfa drugs (Unknown)  tetanus immune globulin (Unknown)    LABS:    05-14    152<H>  |  106  |  <3<L>  ----------------------------<  139<H>  3.6   |  25  |  0.5<L>    Ca    7.7<L>      14 May 2018 09:25  Phos  1.9     05-14  Mg     2.2     05-14    PT/INR - ( 13 May 2018 21:47 )   PT: 16.70 sec;   INR: 1.53 ratio     PTT - ( 13 May 2018 21:47 )  PTT:31.4 sec      RADIOLOGY:  < from: CT Abdomen and Pelvis w/ Oral Cont and w/ IV Cont (05.14.18 @ 06:28) >    IMPRESSION:     Interval decompression of the small bowel since 5/7/2018. Persistent   large bowel obstruction with abrupt transition point in the distal   descending colon, suspicious for an underlying mass.  Stable 2.8 x 2.9 cm indeterminate hypodensity in segment 4. A metastatic   lesion cannot be excluded.  Stable central intrahepatic biliary ductal dilatation and common bile   duct dilation to 2.0 cm of uncertain etiology.   Stable irregular hyperdensity within the gallbladder lumen measuring   approximately 3.7 x 2.1 cm. a malignant gallbladder mass cannot be

## 2018-05-15 NOTE — PROGRESS NOTE ADULT - PROBLEM SELECTOR PROBLEM 1
Aspiration pneumonia, unspecified aspiration pneumonia type, unspecified laterality, unspecified part of lung
Complete intestinal obstruction, unspecified cause
Colon obstruction

## 2018-05-15 NOTE — PROGRESS NOTE ADULT - SUBJECTIVE AND OBJECTIVE BOX
KARINA CUNNINGHAM  82y  Female      Patient is a 82y old  Female who presents with a chief complaint of vomitting,pt pulled out NG ,not to replace as per pt pt wish to die in peace      REVIEW OF SYSTEMS:  CONSTITUTIONAL: No fever, weight loss, or fatigue  EYES: No eye pain, visual disturbances, or discharge  ENMT:  No difficulty hearing, tinnitus, vertigo; No sinus or throat pain  NECK: No pain or stiffness  BREASTS: No pain, masses, or nipple discharge  RESPIRATORY: No cough, wheezing, chills or hemoptysis; No shortness of breath  CARDIOVASCULAR: No chest pain, palpitations, dizziness, or leg swelling  GASTROINTESTINAL: No abdominal or epigastric pain.   GENITOURINARY: No dysuria, frequency, hematuria, or incontinence  NEUROLOGICAL: No headaches, memory loss, loss of strength, numbness, or tremors  SKIN: No itching, burning, rashes, or lesions   LYMPH NODES: No enlarged glands  ENDOCRINE: No heat or cold intolerance; No hair loss  MUSCULOSKELETAL: No joint pain or swelling; No muscle, back, or extremity pain  PSYCHIATRIC: No depression, anxiety, mood swings, or difficulty sleeping  HEME/LYMPH: No easy bruising, or bleeding gums  ALLERY AND IMMUNOLOGIC: No hives or eczema  FAMILY HISTORY:    T(C): 36.4 (05-15-18 @ 05:30), Max: 36.7 (05-14-18 @ 22:22)  HR: 117 (05-15-18 @ 05:30) (115 - 117)  BP: 110/64 (05-15-18 @ 05:30) (106/70 - 110/64)  RR: 16 (05-15-18 @ 05:30) (16 - 16)  SpO2: 95% (05-14-18 @ 08:30) (95% - 95%)  Wt(kg): --Vital Signs Last 24 Hrs  T(C): 36.4 (15 May 2018 05:30), Max: 36.7 (14 May 2018 22:22)  T(F): 97.5 (15 May 2018 05:30), Max: 98.1 (14 May 2018 22:22)  HR: 117 (15 May 2018 05:30) (115 - 117)  BP: 110/64 (15 May 2018 05:30) (106/70 - 110/64)  BP(mean): --  RR: 16 (15 May 2018 05:30) (16 - 16)  SpO2: 95% (14 May 2018 08:30) (95% - 95%)  Bactrim (Unknown)  sulfa drugs (Unknown)  tetanus immune globulin (Unknown)      PHYSICAL EXAM:  GENERAL: NAD, well-groomed, well-developed  HEAD:  Atraumatic, Normocephalic  EYES: EOMI, PERRLA, conjunctiva and sclera clear  ENMT: No tonsillar erythema, exudates, or enlargement;   NECK: Supple, No JVD, Normal thyroid  NERVOUS SYSTEM:  Alert & Oriented X3,   CHEST/LUNG: Clear to percussion bilaterally; No rales, rhonchi, wheezing, or rubs  HEART: Regular rate and rhythm; No murmurs, rubs, or gallops  ABDOMEN: Soft, Nontender, slightly distended  EXTREMITIES:  2+ Peripheral Pulses, No clubbing, cyanosis, or edema  LYMPH: No lymphadenopathy noted  SKIN: No rashes or lesions      LABS:  05-14    152<H>  |  106  |  <3<L>  ----------------------------<  139<H>  3.6   |  25  |  0.5<L>    Ca    7.7<L>      14 May 2018 09:25  Phos  1.9     05-14  Mg     2.2     05-14                          8.6    11.38 )-----------( 485      ( 14 May 2018 09:25 )             29.1     < from: CT Abdomen and Pelvis w/ Oral Cont and w/ IV Cont (05.14.18 @ 06:28) >  Interval decompression of the small bowel since 5/7/2018. Persistent   large bowel obstruction with abrupt transition point in the distal   descending colon, suspicious for an underlying mass.    Stable 2.8 x 2.9 cm indeterminate hypodensity in segment 4. A metastatic   lesion cannot be excluded.    Stable central intrahepatic biliary ductal dilatation and common bile   duct dilation to 2.0 cm of uncertain etiology.     Stable irregular hyperdensity within the gallbladder lumen measuring   approximately 3.7 x 2.1 cm. a malignant gallbladder mass cannot be   excluded.    MRI of the abdomen with contrast and MRCP are recommended for further   characterization.                  < end of copied text >        RADIOLOGY & ADDITIONAL TESTS:    MEDICATION:  acetaminophen   Tablet. 650 milliGRAM(s) Oral every 6 hours PRN  acetaminophen  Suppository. 650 milliGRAM(s) Rectal every 8 hours PRN  ALBUTerol/ipratropium for Nebulization 3 milliLiter(s) Nebulizer every 6 hours  carBAMazepine 200 milliGRAM(s) Oral two times a day  dextrose 5%. 1000 milliLiter(s) IV Continuous <Continuous>  fentaNYL   Patch  75 MICROgram(s)/Hr 1 Patch Transdermal every 72 hours  heparin  Injectable 5000 Unit(s) SubCutaneous every 12 hours  levETIRAcetam  IVPB 375 milliGRAM(s) IV Intermittent every 12 hours  metoprolol tartrate Injectable 5 milliGRAM(s) IV Push every 6 hours PRN  morphine  - Injectable 2 milliGRAM(s) IV Push every 3 hours PRN  ondansetron Injectable 4 milliGRAM(s) IV Push every 6 hours PRN  zolpidem 5 milliGRAM(s) Enteral Tube at bedtime PRN      HEALTH ISSUES - PROBLEM Dx:  Aspiration pneumonia, unspecified aspiration pneumonia type, unspecified laterality, unspecified part of lung: Aspiration pneumonia, unspecified aspiration pneumonia type, unspecified laterality, unspecified part of lung  Colon obstruction: Colon obstruction,  Complete intestinal obstruction, unspecified cause: Complete intestinal obstruction, unspecified cause  Colon neoplasm: Colon neoplasm  Aspiration pneumonia: Aspiration pneumonia on iv zosyn  LBO pt refused to have NG placement,want Hospice,will call hospice   unable to contact with family,LMOT ,cancel MRI,will do ice

## 2018-05-15 NOTE — PROGRESS NOTE ADULT - PROBLEM SELECTOR PLAN 1
cont NPO, NGT to LCS, IVF  await plan from GI re Colonoscopy for diagnosis  Pt needs MRI to evaluate liver and kidney lesions on CT - will d/w Dr Childress
refused NG tube    Afebrile  completed 8 days of abx    ambulate to prevent new pneumonia   GI/ surgical follow up    DC all abx  recall if needed
c/w ngt suction  serial exam  monitor lytes  c/w npo ivf  will d/w DR noe

## 2018-05-15 NOTE — PROGRESS NOTE ADULT - SUBJECTIVE AND OBJECTIVE BOX
NEPHROLOGY FOLLOW UP:    Pt seen and examined.  Doing poorly.  Still hypernatremic on D5W.  NGT out, NPO.  Hospice consulted    PAST MEDICAL & SURGICAL HISTORY:  Osteoporosis  COPD (chronic obstructive pulmonary disease)  High cholesterol  Colitis  Depression  Hypertension  Anemia    Allergies:  Bactrim (Unknown)  sulfa drugs (Unknown)  tetanus immune globulin (Unknown)    Home Medications Reviewed    SOCIAL HISTORY:  Denies ETOH,Smoking,   FAMILY HISTORY:    advance care planning and advance directives reviewed and d/w pt     Yes      REVIEW OF SYSTEMS:    All other review of systems is negative unless indicated above.    PHYSICAL EXAM:  shallow respirations  dry mm  no jvd  cta bl  rrr  soft, nt, + bs  no brand  no edema  + left arm PICC    Hospital Medications:   MEDICATIONS  (STANDING):  ALBUTerol/ipratropium for Nebulization 3 milliLiter(s) Nebulizer every 6 hours  carBAMazepine 200 milliGRAM(s) Oral two times a day  dextrose 5%. 1000 milliLiter(s) (100 mL/Hr) IV Continuous <Continuous>  fentaNYL   Patch  75 MICROgram(s)/Hr 1 Patch Transdermal every 72 hours  heparin  Injectable 5000 Unit(s) SubCutaneous every 12 hours  levETIRAcetam  IVPB 375 milliGRAM(s) IV Intermittent every 12 hours        VITALS:  T(F): 97.5 (05-15-18 @ 05:30), Max: 98.1 (05-14-18 @ 22:22)  HR: 117 (05-15-18 @ 05:30)  BP: 110/64 (05-15-18 @ 05:30)  RR: 16 (05-15-18 @ 05:30)  SpO2: --  Wt(kg): --    05-13 @ 07:01  -  05-14 @ 07:00  --------------------------------------------------------  IN: 2085 mL / OUT: 4175 mL / NET: -2090 mL    05-14 @ 07:01  -  05-15 @ 07:00  --------------------------------------------------------  IN: 0 mL / OUT: 1350 mL / NET: -1350 mL          LABS:  05-15    151<H>  |  106  |  3<L>  ----------------------------<  108<H>  3.7   |  25  |  0.7    Ca    7.5<L>      15 May 2018 07:26  Phos  1.1     05-15  Mg     2.2     05-15    TPro  5.0<L>  /  Alb  2.6<L>  /  TBili  <0.2  /  DBili      /  AST  13  /  ALT  6   /  AlkPhos  62  05-15                          8.2    10.15 )-----------( 427      ( 15 May 2018 07:26 )             27.3       Urine Studies:        RADIOLOGY & ADDITIONAL STUDIES:

## 2018-05-15 NOTE — CONSULT NOTE ADULT - ATTENDING COMMENTS
NIKI Ivey
above noted ct scan noted
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  check tegretol level, Mg++
I examined the patient in ER and went over all the tests done in ER. Called her son at home and had a lengthy conversation with him regarding his mother's examination and possibilities. She had signed DNR / DNI in 8/2017 and recently wished no hospitalization and refused to be admitted last month when she had pneumonia. Patient has abdominal problems going on for over 2 months and refused investigations.  Her BP is 108/ 70 and she is in no respiratory distress. She will be admitted to the floor and appropriate work up and treatment to be given and continuing DNR / DNI. Son agrees with it and patient understands to some extent.
pt examined together with PA - above exam added to, meds and labs reviewed.  IMP:  - distal colon obstruction, probably cancer but unable to obtain pathology  - LLL pneumonia  - hypernatremia    PLAN:  - resume d5w per Nephrology  - pt refuses reinsertion of NG tube, reportedly declines any further diagnostic studies or treatment  - hospice consult requested  - SNS no appropriate at this time, in the absence of active treatment.  Will f/u if situation changes.

## 2018-05-15 NOTE — PROGRESS NOTE ADULT - SUBJECTIVE AND OBJECTIVE BOX
infectious diseases progress note:  KARINA CUNNINGHAM is a 82yFemale patient    ASPIRATION PNEUMONIA COLON NEOPLASM    Colon obstruction  Complete intestinal obstruction, unspecified cause  Colon neoplasm  Aspiration pneumonia      ROS:  not relevant     Allergies    Bactrim (Unknown)  sulfa drugs (Unknown)  tetanus immune globulin (Unknown)    Intolerances        ANTIBIOTICS/RELEVANT:  antimicrobials  piperacillin/tazobactam IVPB. 3.375 Gram(s) IV Intermittent every 8 hours    immunologic:    OTHER:  acetaminophen   Tablet. 650 milliGRAM(s) Oral every 6 hours PRN  acetaminophen  Suppository. 650 milliGRAM(s) Rectal every 8 hours PRN  ALBUTerol/ipratropium for Nebulization 3 milliLiter(s) Nebulizer every 6 hours  carBAMazepine 200 milliGRAM(s) Oral two times a day  dextrose 5%. 1000 milliLiter(s) IV Continuous <Continuous>  fentaNYL   Patch  75 MICROgram(s)/Hr 1 Patch Transdermal every 72 hours  heparin  Injectable 5000 Unit(s) SubCutaneous every 12 hours  levETIRAcetam  IVPB 375 milliGRAM(s) IV Intermittent every 12 hours  metoprolol tartrate Injectable 5 milliGRAM(s) IV Push every 6 hours PRN  morphine  - Injectable 2 milliGRAM(s) IV Push every 3 hours PRN  ondansetron Injectable 4 milliGRAM(s) IV Push every 6 hours PRN  zolpidem 5 milliGRAM(s) Enteral Tube at bedtime PRN      Objective:  T(F): 97.5 (05-15-18 @ 05:30), Max: 98.1 (05-14-18 @ 22:22)  HR: 117 (05-15-18 @ 05:30) (115 - 117)  BP: 110/64 (05-15-18 @ 05:30) (106/70 - 110/64)  RR: 16 (05-15-18 @ 05:30) (16 - 16)  SpO2: 95% (05-14-18 @ 08:30) (95% - 95%)    PHYSICAL EXAM:  Constitutional: comfortable   Neck:no JVD, no lymphadenopathy, supple  Respiratory: few rhonchi talat  Cardiovascular: S1S2 RRR  Gastrointestinal:soft, (+) BS, no HSM  Extremities:no phlebitis         LABS:                        8.6    11.38 )-----------( 485      ( 14 May 2018 09:25 )             29.1     05-14    152<H>  |  106  |  <3<L>  ----------------------------<  139<H>  3.6   |  25  |  0.5<L>    Ca    7.7<L>      14 May 2018 09:25  Phos  1.9     05-14  Mg     2.2     05-14      PT/INR - ( 13 May 2018 21:47 )   PT: 16.70 sec;   INR: 1.53 ratio         PTT - ( 13 May 2018 21:47 )  PTT:31.4 sec        MICROBIOLOGY:    Culture - Urine (collected 05-08-18 @ 19:20)  Source: .Urine Clean Catch (Midstream)  Final Report (05-11-18 @ 15:12):    10,000 - 49,000 CFU/mL Yeast-like cells, presumptively not Candida    albicans    Culture - Blood (collected 05-08-18 @ 14:57)  Source: .Blood Blood  Final Report (05-14-18 @ 01:00):    No growth at 5 days.

## 2018-05-15 NOTE — PROGRESS NOTE ADULT - SUBJECTIVE AND OBJECTIVE BOX
DIAGNOSIS:   HOSPITAL DAY #:    STATUS POST:    POST OPERATIVE DAY #:     Vital Signs Last 24 Hrs  T(C): 36.4 (15 May 2018 05:30), Max: 36.7 (14 May 2018 22:22)  T(F): 97.5 (15 May 2018 05:30), Max: 98.1 (14 May 2018 22:22)  HR: 117 (15 May 2018 05:30) (115 - 117)  BP: 110/64 (15 May 2018 05:30) (106/70 - 110/64)  BP(mean): --  RR: 16 (15 May 2018 05:30) (16 - 16)  SpO2: --    SUBJECTIVE: Pt seen    Pain: YES  [ ]   NO [ ]   Nausea: [ ] YES [ ] NO           Vomiting: [ ] YES [ ] NO  Flatus: [ ] YES [ ] NO             Bowel Movement: [ ] YES [ ] NO     Void: [ ]YES [ ]No      DRU DRAINAGE: SIGNIFICANT [ ]   NOT SIGNIFICANT [ ]   NOT APPLICABLE [ ]  YES [ ] NO    General Appearance: Appears well, NAD  Neck: Supple  Chest: Equal expansion bilaterally, equal breath sounds  CV: Pulse regular presently  Abdomen: Soft [x ] YES [ ]NO  DISTENDED x[ ] YES [ ] NO TENDERNESS [ ]YES [x ]NO  INCISIONS: HEALING WELL [ ] YES  [ ] NO ERYTHEMA [ ] YES [ ] NO DRAINAGE [ ] YES  [ ] NO  Extremities: Grossly symmetric, CALF TENDERNESS [ ] YES  [ ] NO  will discuss case with parma  LABS:                        8.2    10.15 )-----------( 427      ( 15 May 2018 07:26 )             27.3     05-15    151<H>  |  106  |  3<L>  ----------------------------<  108<H>  3.7   |  25  |  0.7    Ca    7.5<L>      15 May 2018 07:26  Phos  1.1     05-15  Mg     2.2     05-15    TPro  5.0<L>  /  Alb  2.6<L>  /  TBili  <0.2  /  DBili  x   /  AST  13  /  ALT  6   /  AlkPhos  62  05-15    PT/INR - ( 13 May 2018 21:47 )   PT: 16.70 sec;   INR: 1.53 ratio         PTT - ( 13 May 2018 21:47 )  PTT:31.4 sec        ASSESSMENT:     GOOD POST OP COURSE [ ]  YES  [ ] NO  CONDITION IMPROVING  []  YES  [ ]  NO          PLAN:    CONTINUE PRESENT MANAGEMENT  [ ] YES  [ ] NO

## 2018-05-15 NOTE — CHART NOTE - NSCHARTNOTEFT_GEN_A_CORE
Pt NGT pulled out.  Will need to reinsert NGT, however pt is refusing reinsertion of tube.  Explained to pt need for tube.  Unsure if pt fully understands the importance.  Will ask for assistance from colleague to reinsert tube.  Pt has small and large bowel obstructions, GI and Surgery are on the case. Pt NGT pulled out.  Will need to reinsert NGT, however pt is refusing reinsertion of tube.  Explained to pt need for tube.  Unsure if pt fully understands the importance.  Pt is A&Ox3.  Will ask for assistance from colleague to reinsert tube.  Pt has small and large bowel obstructions, GI and Surgery are on the case. Pt NGT pulled out.  Will need to reinsert NGT, however pt is refusing reinsertion of tube.  Explained to pt need for tube.  Unsure if pt fully understands the importance.  Pt is A&Ox3.  Will ask for assistance from colleague to reinsert tube.  Pt has small and large bowel obstructions, GI and Surgery are on the case.    Addendum:  At 7:00am, returned to pts room with Colleague Dax.  Dax asked pt that we need to reinsert the NGT, pt continues to refuse the NGT.

## 2018-05-15 NOTE — PROGRESS NOTE ADULT - SUBJECTIVE AND OBJECTIVE BOX
Patient is a 82y old  Female who presents with a chief complaint of     HPI:  82 years old female coming fro [facility] c/o distended abdomen and coffe ground vomit for 2 days. (07 May 2018 23:32)      INTERVAL HPI/OVERNIGHT EVENTS: Pt seen and examined at bedside, in NAD. Denies N/V/D, abdominal pain, CP, SOB, palpitations, fevers.  Not much change Pt lethargic.        REVIEW OF SYSTEMS:  CONSTITUTIONAL: No fever, weight loss, or fatigue  EYES: No eye pain, visual disturbances, or discharge  ENMT:  No difficulty hearing, tinnitus, vertigo; No sinus or throat pain  NECK: No pain or stiffness  BREASTS: No pain, no masses,   RESPIRATORY: No cough, wheezing, chills or hemoptysis; No shortness of breath  CARDIOVASCULAR: No chest pain, palpitations, dizziness, or leg swelling  GASTROINTESTINAL: No abdominal or epigastric pain. No nausea, vomiting, or hematemesis; No diarrhea or constipation. No melena or hematochezia.  GENITOURINARY: No dysuria, frequency, hematuria, or incontinence  NEUROLOGICAL: No headaches, memory loss, loss of strength, numbness, or tremors  SKIN: No itching, burning, rashes, or lesions   LYMPH NODES: No enlarged glands  MUSCULOSKELETAL: No joint pain or swelling; No muscle, back, or extremity pain  PSYCHIATRIC: No depression, anxiety, mood swings, or difficulty sleeping  ALLERY No hives or eczema    PHYSICAL EXAM:  GENERAL: NAD, well-groomed, well-developed  HEAD:  Atraumatic, Normocephalic  EYES: EOMI, PERRLA, conjunctiva and sclera clear  ENMT: No tonsillar erythema, exudates, or enlargement; Moist mucous membranes, Good dentition, No lesions  NECK: Supple, No JVD, Normal thyroid  NERVOUS SYSTEM:  Alert & Oriented X3, Good concentration; Motor Strength 5/5 B/L upper and lower extremities; DTRs 2+ intact and symmetric  CHEST/LUNG: Clear to percussion bilaterally; No rales, rhonchi, wheezing, or rubs  HEART: Regular rate and rhythm; No murmurs, rubs, or gallops  ABDOMEN: Soft, Nontender, Less distended; Bowel sounds present  EXTREMITIES:  2+ Peripheral Pulses, No clubbing, cyanosis, or edema  LYMPH: No lymphadenopathy noted  SKIN: No rashes or lesions    T(C): 36.5 (05-15-18 @ 14:00), Max: 36.7 (05-14-18 @ 22:22)  HR: 113 (05-15-18 @ 14:00) (113 - 117)  BP: 119/74 (05-15-18 @ 14:00) (110/63 - 119/74)  RR: 16 (05-15-18 @ 14:00) (16 - 16)  SpO2: --  Wt(kg): --  I&O's Summary    14 May 2018 07:01  -  15 May 2018 07:00  --------------------------------------------------------  IN: 0 mL / OUT: 1350 mL / NET: -1350 mL        MEDICATIONS  (STANDING):  ALBUTerol/ipratropium for Nebulization 3 milliLiter(s) Nebulizer every 6 hours  carBAMazepine 200 milliGRAM(s) Oral two times a day  dextrose 5%. 1000 milliLiter(s) (100 mL/Hr) IV Continuous <Continuous>  fentaNYL   Patch  75 MICROgram(s)/Hr 1 Patch Transdermal every 72 hours  heparin  Injectable 5000 Unit(s) SubCutaneous every 12 hours  levETIRAcetam  IVPB 375 milliGRAM(s) IV Intermittent every 12 hours    MEDICATIONS  (PRN):  acetaminophen   Tablet. 650 milliGRAM(s) Oral every 6 hours PRN Moderate Pain (4 - 6)  acetaminophen  Suppository. 650 milliGRAM(s) Rectal every 8 hours PRN Mild Pain (1 - 3)  metoprolol tartrate Injectable 5 milliGRAM(s) IV Push every 6 hours PRN elevated HR  morphine  - Injectable 2 milliGRAM(s) IV Push every 3 hours PRN Severe Pain (7 - 10)  ondansetron Injectable 4 milliGRAM(s) IV Push every 6 hours PRN Nausea and/or Vomiting  zolpidem 5 milliGRAM(s) Enteral Tube at bedtime PRN Insomnia      LABS:                        8.2    10.15 )-----------( 427      ( 15 May 2018 07:26 )             27.3     05-15    151<H>  |  106  |  3<L>  ----------------------------<  108<H>  3.7   |  25  |  0.7    Ca    7.5<L>      15 May 2018 07:26  Phos  1.1     05-15  Mg     2.2     05-15    TPro  5.0<L>  /  Alb  2.6<L>  /  TBili  <0.2  /  DBili  x   /  AST  13  /  ALT  6   /  AlkPhos  62  05-15    PT/INR - ( 13 May 2018 21:47 )   PT: 16.70 sec;   INR: 1.53 ratio         PTT - ( 13 May 2018 21:47 )  PTT:31.4 sec    CAPILLARY BLOOD GLUCOSE                  RADIOLOGY & ADDITIONAL TESTS:    Imaging Personally Reviewed:       Advance Directives:      Palliative Care: Pt on hospice care. Seems comfortable w/o complaint    Rec: present regimen

## 2018-05-16 NOTE — CONSULT NOTE ADULT - SUBJECTIVE AND OBJECTIVE BOX
CARDIOLOGY CONSULT NOTE     CHIEF COMPLAINT/REASON FOR CONSULT:    HPI:  82 years old female coming fro [facility] c/o distended abdomen and coffe ground vomit for 2 days. (07 May 2018 23:32)      PAST MEDICAL & SURGICAL HISTORY:  Osteoporosis  COPD (chronic obstructive pulmonary disease)  High cholesterol  Colitis  Depression  Hypertension  Anemia      Cardiac Risks:   [x ]HTN, [ ] DM, [ ] Smoking, [ x] FH,  [x ] Lipids        MEDICATIONS:  MEDICATIONS  (STANDING):  ALBUTerol/ipratropium for Nebulization 3 milliLiter(s) Nebulizer every 6 hours  carBAMazepine 200 milliGRAM(s) Oral two times a day  dextrose 5%. 1000 milliLiter(s) (100 mL/Hr) IV Continuous <Continuous>  fentaNYL   Patch  75 MICROgram(s)/Hr 1 Patch Transdermal every 72 hours  heparin  Injectable 5000 Unit(s) SubCutaneous every 12 hours  levETIRAcetam  IVPB 375 milliGRAM(s) IV Intermittent every 12 hours      FAMILY HISTORY:      SOCIAL HISTORY:      [ ] Marital status   Allergies    Bactrim (Unknown)  sulfa drugs (Unknown)  tetanus immune globulin (Unknown)      	    REVIEW OF SYSTEMS:  CONSTITUTIONAL: No fever, weight loss, or fatigue  EYES: No eye pain, visual disturbances, or discharge  ENMT:  No difficulty hearing, tinnitus, vertigo; No sinus or throat pain  NECK: No pain or stiffness  RESPIRATORY: No cough, wheezing, chills or hemoptysis; No Shortness of Breath  CARDIOVASCULAR: No chest pain, palpitations, passing out, dizziness, or leg swelling  GASTROINTESTINAL: No abdominal or epigastric pain. No nausea, vomiting, or hematemesis; No diarrhea or constipation. No melena or hematochezia.  GENITOURINARY: No dysuria, frequency, hematuria, or incontinence  NEUROLOGICAL: No headaches, memory loss, loss of strength, numbness, or tremors  SKIN: No itching, burning, rashes, or lesions   	      PHYSICAL EXAM:  T(C): 35.9 (05-16-18 @ 05:28), Max: 36.5 (05-15-18 @ 14:00)  HR: 113 (05-16-18 @ 05:28) (113 - 119)  BP: 166/88 (05-16-18 @ 05:28) (108/65 - 166/88)  RR: 16 (05-16-18 @ 05:28) (16 - 16)  SpO2: 94% (05-16-18 @ 07:20) (94% - 94%)  Wt(kg): --  I&O's Summary    15 May 2018 07:01  -  16 May 2018 07:00  --------------------------------------------------------  IN: 1000 mL / OUT: 300 mL / NET: 700 mL        Appearance: Normal	  Psychiatry: A & O x 3, Mood & affect appropriate  HEENT:   Normal oral mucosa, PERRL, EOMI	  Lymphatic: No lymphadenopathy  Cardiovascular: Normal S1 S2,RRR, No JVD, No murmurs  Respiratory: Lungs clear to auscultation	  Gastrointestinal:  Soft, Non-tender, + BS	  Skin: No rashes, No ecchymoses, No cyanosis	  Neurologic: Non-focal  Extremities: Normal range of motion, No clubbing, cyanosis or edema  Vascular: Peripheral pulses palpable 2+ bilaterally      ECG:  	st lvh ? inf scar    	  LABS:	 	    CARDIAC MARKERS:                                    8.8    13.24 )-----------( 477      ( 16 May 2018 09:15 )             28.9     05-15    153<H>  |  109  |  4<L>  ----------------------------<  106<H>  3.7   |  29  |  0.6<L>    Ca    7.7<L>      15 May 2018 13:35  Phos  1.1     05-15  Mg     2.2     05-15    TPro  5.0<L>  /  Alb  2.6<L>  /  TBili  <0.2  /  DBili  x   /  AST  13  /  ALT  6   /  AlkPhos  62  05-15

## 2018-05-16 NOTE — PROGRESS NOTE ADULT - SUBJECTIVE AND OBJECTIVE BOX
NEPHROLOGY FOLLOW UP:    Pt seen and examined.  Spoke with GI and son.  Son requesting dc to [facility].  Pt still on D5W gtt.    PAST MEDICAL & SURGICAL HISTORY:  Osteoporosis  COPD (chronic obstructive pulmonary disease)  High cholesterol  Colitis  Depression  Hypertension  Anemia    Allergies:  Bactrim (Unknown)  sulfa drugs (Unknown)  tetanus immune globulin (Unknown)    Home Medications Reviewed    SOCIAL HISTORY:  Denies ETOH,Smoking,   FAMILY HISTORY:    advance care planning and advance directives reviewed and d/w pt     Yes      REVIEW OF SYSTEMS:    All other review of systems is negative unless indicated above.    PHYSICAL EXAM:  shallow respirations  dry mm  no jvd  cta bl  rrr  soft, nt, + bs  no brand  no edema  + left arm PICC            Hospital Medications:   MEDICATIONS  (STANDING):  ALBUTerol/ipratropium for Nebulization 3 milliLiter(s) Nebulizer every 6 hours  carBAMazepine 200 milliGRAM(s) Oral two times a day  dextrose 5%. 1000 milliLiter(s) (100 mL/Hr) IV Continuous <Continuous>  fentaNYL   Patch  75 MICROgram(s)/Hr 1 Patch Transdermal every 72 hours  heparin  Injectable 5000 Unit(s) SubCutaneous every 12 hours  levETIRAcetam  IVPB 375 milliGRAM(s) IV Intermittent every 12 hours        VITALS:  T(F): 95.8 (05-16-18 @ 14:16), Max: 97.1 (05-15-18 @ 22:01)  HR: 122 (05-16-18 @ 14:16)  BP: 133/64 (05-16-18 @ 14:16)  RR: 16 (05-16-18 @ 14:16)  SpO2: 94% (05-16-18 @ 07:20)  Wt(kg): --    05-14 @ 07:01  -  05-15 @ 07:00  --------------------------------------------------------  IN: 0 mL / OUT: 1350 mL / NET: -1350 mL    05-15 @ 07:01  -  05-16 @ 07:00  --------------------------------------------------------  IN: 1000 mL / OUT: 300 mL / NET: 700 mL    05-16 @ 07:01  -  05-16 @ 16:07  --------------------------------------------------------  IN: 0 mL / OUT: 75 mL / NET: -75 mL          LABS:  05-16    144  |  101  |  6<L>  ----------------------------<  143<H>  4.0   |  27  |  0.6<L>    Ca    7.7<L>      16 May 2018 09:15  Phos  1.1     05-15  Mg     2.2     05-15    TPro  5.1<L>  /  Alb  2.7<L>  /  TBili  <0.2  /  DBili      /  AST  13  /  ALT  6   /  AlkPhos  69  05-16                          8.8    13.24 )-----------( 477      ( 16 May 2018 09:15 )             28.9       Urine Studies:        RADIOLOGY & ADDITIONAL STUDIES:

## 2018-05-16 NOTE — CONSULT NOTE ADULT - PROVIDER SPECIALTY LIST ADULT
Cardiology
Cardiology
Critical Care
Gastroenterology
Infectious Disease
Nutrition Support
Pulmonology
Neurology
Nephrology
Surgery

## 2018-05-16 NOTE — CONSULT NOTE ADULT - CONSULT REQUESTED DATE/TIME
07-May-2018 21:00
08-May-2018 13:25
09-May-2018 08:54
13-May-2018
14-May-2018 08:57
16-May-2018
16-May-2018 09:13
09-May-2018 14:17
14-May-2018 10:25
08-May-2018 15:54

## 2018-05-16 NOTE — PROGRESS NOTE ADULT - SUBJECTIVE AND OBJECTIVE BOX
KARINA CUNNINGHAM  82y  Female      Patient is a 82y old  Female who presents with a chief complaint of vomitting,now pt refused NG,unable to breathe      REVIEW OF SYSTEMS:  CONSTITUTIONAL: No fever, weight loss,   EYES: No eye pain, visual disturbances, or discharge  ENMT:  No difficulty hearing, tinnitus, vertigo; No sinus or throat pain  NECK: No pain or stiffness  BREASTS: No pain, masses, or nipple discharge  RESPIRATORY: No cough, wheezing, chills or hemoptysis; sob+  CARDIOVASCULAR: No chest pain, palpitations, dizziness, or leg swelling  GASTROINTESTINAL: distended abdomen  GENITOURINARY: No dysuria, frequency, hematuria, or incontinence  NEUROLOGICAL: No headaches, memory loss, loss of strength, numbness, or tremors  SKIN: No itching, burning, rashes, or lesions   LYMPH NODES: No enlarged glands  ENDOCRINE: No heat or cold intolerance; No hair loss  MUSCULOSKELETAL: No joint pain or swelling; No muscle, back, or extremity pain  PSYCHIATRIC: No depression, anxiety, mood swings, or difficulty sleeping  HEME/LYMPH: No easy bruising, or bleeding gums  ALLERY AND IMMUNOLOGIC: No hives or eczema  FAMILY HISTORY:    T(C): 35.9 (05-16-18 @ 05:28), Max: 36.5 (05-15-18 @ 14:00)  HR: 113 (05-16-18 @ 05:28) (113 - 119)  BP: 166/88 (05-16-18 @ 05:28) (108/65 - 166/88)  RR: 16 (05-16-18 @ 05:28) (16 - 16)  SpO2: --  Wt(kg): --Vital Signs Last 24 Hrs  T(C): 35.9 (16 May 2018 05:28), Max: 36.5 (15 May 2018 14:00)  T(F): 96.6 (16 May 2018 05:28), Max: 97.7 (15 May 2018 14:00)  HR: 113 (16 May 2018 05:28) (113 - 119)  BP: 166/88 (16 May 2018 05:28) (108/65 - 166/88)  BP(mean): --  RR: 16 (16 May 2018 05:28) (16 - 16)  SpO2: --  Bactrim (Unknown)  sulfa drugs (Unknown)  tetanus immune globulin (Unknown)      PHYSICAL EXAM:  GENERAL: NAD, well-groomed, well-developed  HEAD:  Atraumatic, Normocephalic  EYES: EOMI, PERRLA, conjunctiva and sclera clear  ENMT: No tonsillar erythema, exudates, or enlargement;   NECK: Supple, No JVD, Normal thyroid  NERVOUS SYSTEM:  Alert &   CHEST/LUNG: Clear to percussion bilaterally; No rales, rhonchi, wheezing, or rubs  HEART: Regular rate and rhythm; No murmurs, rubs, or gallops  ABDOMEN: Soft, distended+  EXTREMITIES:  2+ Peripheral Pulses, No clubbing, cyanosis, edema+  LYMPH: No lymphadenopathy noted  SKIN: No rashes or lesions      LABS:  05-15    153<H>  |  109  |  4<L>  ----------------------------<  106<H>  3.7   |  29  |  0.6<L>    Ca    7.7<L>      15 May 2018 13:35  Phos  1.1     05-15  Mg     2.2     05-15    TPro  5.0<L>  /  Alb  2.6<L>  /  TBili  <0.2  /  DBili  x   /  AST  13  /  ALT  6   /  AlkPhos  62  05-15                          8.2    10.15 )-----------( 427      ( 15 May 2018 07:26 )             27.3         RADIOLOGY & ADDITIONAL TESTS:    MEDICATION:  acetaminophen   Tablet. 650 milliGRAM(s) Oral every 6 hours PRN  acetaminophen  Suppository. 650 milliGRAM(s) Rectal every 8 hours PRN  ALBUTerol/ipratropium for Nebulization 3 milliLiter(s) Nebulizer every 6 hours  carBAMazepine 200 milliGRAM(s) Oral two times a day  dextrose 5%. 1000 milliLiter(s) IV Continuous <Continuous>  fentaNYL   Patch  75 MICROgram(s)/Hr 1 Patch Transdermal every 72 hours  heparin  Injectable 5000 Unit(s) SubCutaneous every 12 hours  levETIRAcetam  IVPB 375 milliGRAM(s) IV Intermittent every 12 hours  metoprolol tartrate Injectable 5 milliGRAM(s) IV Push every 6 hours PRN  morphine  - Injectable 2 milliGRAM(s) IV Push every 3 hours PRN  ondansetron Injectable 4 milliGRAM(s) IV Push every 6 hours PRN  zolpidem 5 milliGRAM(s) Enteral Tube at bedtime PRN      HEALTH ISSUES - PROBLEM Dx:  Anemia: Anemia  Hypertension: Hypertension lopressor  Aspiration pneumonia, unspecified aspiration pneumonia type, unspecified laterality, unspecified part of lung: Aspiration pneumonia, unspecified aspiration pneumonia type, unspecified laterality, unspecified part of lung  Colon obstruction: Colon obstruction,might go for colostomy  Complete intestinal obstruction, unspecified cause: Complete intestinal obstruction, unspecified cause,pt is refusing NG  Colon neoplasm: Colon neoplasm  Aspiration pneumonia: Aspiration pneumonia s/p zosyn    case d/w son about Ct scan finding,?colon mass,Gallbladder mass,currently pt is in SOB,probably due to low albumin 2.6,npointravenous fluids,pt is high risk to go for surgery,will call pulmonary consult,CXR   hypernatremia on d5w,overall poor prognosis

## 2018-05-16 NOTE — CONSULT NOTE ADULT - ASSESSMENT
Impression:  Infiltrates/atelectasis B/L L>R likely due to aspiration  abdominal distention  Hypernatremia due to free H2O deficit    Suggest:    Pt is asking for comfort care  Hospice consult  If worsen SOB then I would institute narcotics for dyspnea relief    Prognosis poor even with aggressive care. Impression:  Infiltrates/atelectasis B/L L>R likely due to aspiration  abdominal distention  Hypernatremia due to free H2O deficit    Suggest:    Pt is asking for comfort care  Hospice consult  If worsen SOB then I would institute narcotics for dyspnea relief    Prognosis poor even with aggressive care.    I Spoke at length with the son and told him she is at a high risk for a surgical procedure and sedation. It is highly likely that she would wind up on vent anf that it would be very difficult to get her off afterwards.

## 2018-05-16 NOTE — PROGRESS NOTE ADULT - SUBJECTIVE AND OBJECTIVE BOX
Called by radiology that patient refused to go for CT scan today S; Pt seen earlier; moaning, awake, but not verbally responsive. On nasal cannula. Spoke with Dr. Monk:  will get CT A/P to evaluate SBO   O: Vital Signs Last 24 Hrs  T(C): 35.4 (16 May 2018 14:16), Max: 36.2 (15 May 2018 22:01)  T(F): 95.8 (16 May 2018 14:16), Max: 97.1 (15 May 2018 22:01)  HR: 122 (16 May 2018 14:16) (113 - 122)  BP: 133/64 (16 May 2018 14:16) (108/65 - 166/88)  BP(mean): --  RR: 16 (16 May 2018 14:16) (16 - 16)  SpO2: 94% (16 May 2018 07:20) (94% - 94%)    EXAM:    Abd: soft, distended; ?mild tenderness    LABS:                          8.8    13.24 )-----------( 477      ( 16 May 2018 09:15 )             28.9   05-16    144  |  101  |  6<L>  ----------------------------<  143<H>  4.0   |  27  |  0.6<L>    Ca    7.7<L>      16 May 2018 09:15  Phos  1.1     05-15  Mg     2.2     05-15    TPro  5.1<L>  /  Alb  2.7<L>  /  TBili  <0.2  /  DBili  x   /  AST  13  /  ALT  6   /  AlkPhos  69  05-16  PT/INR - ( 16 May 2018 09:15 )   PT: 17.80 sec;   INR: 1.63 ratio         PTT - ( 16 May 2018 09:15 )  PTT:27.6 sec      RADIOLOGY: Called by radiology that patient refused to go for CT scan today

## 2018-05-16 NOTE — CONSULT NOTE ADULT - SUBJECTIVE AND OBJECTIVE BOX
KARINA CUNNINGHAM    Female    Patient is a 82y old  Female who presents with a chief complaint of     HPI:  82 years old female coming fro [facility] c/o distended abdomen and coffe ground vomit for 2 days. (07 May 2018 23:32). She has been here for several days. She is now asking for comfort care, hospice has been consulted.          Allergies    Bactrim (Unknown)  sulfa drugs (Unknown)  tetanus immune globulin (Unknown)      HEALTH ISSUES - PROBLEM Dx:  Anemia: Anemia  Hypertension: Hypertension  Aspiration pneumonia, unspecified aspiration pneumonia type, unspecified laterality, unspecified part of lung: Aspiration pneumonia, unspecified aspiration pneumonia type, unspecified laterality, unspecified part of lung  Colon obstruction: Colon obstruction  Complete intestinal obstruction, unspecified cause: Complete intestinal obstruction, unspecified cause  Colon neoplasm: Colon neoplasm  Aspiration pneumonia: Aspiration pneumonia          Vital Signs Last 24 Hrs  T(C): 35.9 (16 May 2018 05:28), Max: 36.5 (15 May 2018 14:00)  T(F): 96.6 (16 May 2018 05:28), Max: 97.7 (15 May 2018 14:00)  HR: 113 (16 May 2018 05:28) (113 - 119)  BP: 166/88 (16 May 2018 05:28) (108/65 - 166/88)  BP(mean): --  RR: 16 (16 May 2018 05:28) (16 - 16)  SpO2: 94% (16 May 2018 07:20) (94% - 94%)    REVIEW OF SYSTEMS:  CONSTITUTIONAL: No fever, weight loss, or fatigue  EYES: No eye pain, visual disturbances, or discharge  NECK: No pain or stiffness  RESPIRATORY: No cough, wheezing, chills or hemoptysis; +shortness of breath  CARDIOVASCULAR: No chest pain, palpitations, dizziness, or leg swelling  GASTROINTESTINAL: +abdominal pain/ distention. No nausea + vomiting, or hematemesis; No diarrhea or constipation. No melena or hematochezia.  GENITOURINARY: No dysuria, frequency, hematuria, or incontinence  NEUROLOGICAL: No headaches, memory loss, loss of strength, numbness, or tremors  MUSCULOSKELETAL: No joint pain or swelling; No muscle, back, or extremity pain                                  8.2    10.15 )-----------( 427      ( 15 May 2018 07:26 )             27.3       05-15    153<H>  |  109  |  4<L>  ----------------------------<  106<H>  3.7   |  29  |  0.6<L>    Ca    7.7<L>      15 May 2018 13:35  Phos  1.1     05-15  Mg     2.2     05-15    TPro  5.0<L>  /  Alb  2.6<L>  /  TBili  <0.2  /  DBili  x   /  AST  13  /  ALT  6   /  AlkPhos  62  05-15            LIVER FUNCTIONS - ( 15 May 2018 07:26 )  Alb: 2.6 g/dL / Pro: 5.0 g/dL / ALK PHOS: 62 U/L / ALT: 6 U/L / AST: 13 U/L / GGT: x                   Radiology: chronic b/l L>R infiltrates, atelectasis worsening    MEDICATIONS  (STANDING):  ALBUTerol/ipratropium for Nebulization 3 milliLiter(s) Nebulizer every 6 hours  carBAMazepine 200 milliGRAM(s) Oral two times a day  dextrose 5%. 1000 milliLiter(s) (100 mL/Hr) IV Continuous <Continuous>  fentaNYL   Patch  75 MICROgram(s)/Hr 1 Patch Transdermal every 72 hours  heparin  Injectable 5000 Unit(s) SubCutaneous every 12 hours  levETIRAcetam  IVPB 375 milliGRAM(s) IV Intermittent every 12 hours    MEDICATIONS  (PRN):  acetaminophen   Tablet. 650 milliGRAM(s) Oral every 6 hours PRN Moderate Pain (4 - 6)  acetaminophen  Suppository. 650 milliGRAM(s) Rectal every 8 hours PRN Mild Pain (1 - 3)  metoprolol tartrate Injectable 5 milliGRAM(s) IV Push every 6 hours PRN elevated HR  morphine  - Injectable 2 milliGRAM(s) IV Push every 3 hours PRN Severe Pain (7 - 10)  ondansetron Injectable 4 milliGRAM(s) IV Push every 6 hours PRN Nausea and/or Vomiting  zolpidem 5 milliGRAM(s) Enteral Tube at bedtime PRN Insomnia      PHYSICAL EXAM:  GENERAL: NAD, well-groomed, well-developed  HEAD:  Atraumatic, Normocephalic  EYES: EOMI, PERRLA, conjunctiva and sclera clear  NERVOUS SYSTEM:  Alert & Oriented X 4, Good concentration; Motor Strength 5/5 B/L upper and lower extremities; DTRs 2+ intact and symmetric  CHEST/LUNG: ronchi bilaterally;   HEART: Regular rate and rhythm; No murmurs, rubs, or gallops  ABDOMEN: Soft, Nontender, ++distended; Bowel sounds decreased  EXTREMITIES:  2+ Peripheral Pulses, No clubbing, cyanosis, or edema  SKIN: No rashes or lesions KARINA CUNNINGHAM    Female    Patient is a 82y old  Female who presents with a chief complaint of     HPI:  82 years old female coming fro [facility] c/o distended abdomen and coffe ground vomit for 2 days. (07 May 2018 23:32). She has been here for several days. She is now asking for comfort care, hospice has been consulted.          Allergies    Bactrim (Unknown)  sulfa drugs (Unknown)  tetanus immune globulin (Unknown)      HEALTH ISSUES - PROBLEM Dx:  Anemia: Anemia  Hypertension: Hypertension  Aspiration pneumonia, unspecified aspiration pneumonia type, unspecified laterality, unspecified part of lung: Aspiration pneumonia, unspecified aspiration pneumonia type, unspecified laterality, unspecified part of lung  Colon obstruction: Colon obstruction  Complete intestinal obstruction, unspecified cause: Complete intestinal obstruction, unspecified cause  Colon neoplasm: Colon neoplasm  Aspiration pneumonia: Aspiration pneumonia          Vital Signs Last 24 Hrs  T(C): 35.9 (16 May 2018 05:28), Max: 36.5 (15 May 2018 14:00)  T(F): 96.6 (16 May 2018 05:28), Max: 97.7 (15 May 2018 14:00)  HR: 113 (16 May 2018 05:28) (113 - 119)  BP: 166/88 (16 May 2018 05:28) (108/65 - 166/88)  BP(mean): --  RR: 16 (16 May 2018 05:28) (16 - 16)  SpO2: 94% (16 May 2018 07:20) (94% - 94%)    REVIEW OF SYSTEMS:  CONSTITUTIONAL: No fever, weight loss, or fatigue  EYES: No eye pain, visual disturbances, or discharge  NECK: No pain or stiffness  RESPIRATORY: No cough, wheezing, chills or hemoptysis; +shortness of breath  CARDIOVASCULAR: No chest pain, palpitations, dizziness, or leg swelling  GASTROINTESTINAL: +abdominal pain/ distention. No nausea + vomiting, or hematemesis; No diarrhea or constipation. No melena or hematochezia.  GENITOURINARY: No dysuria, frequency, hematuria, or incontinence  NEUROLOGICAL: No headaches, memory loss, loss of strength, numbness, or tremors  MUSCULOSKELETAL: No joint pain or swelling; No muscle, back, or extremity pain                                  8.2    10.15 )-----------( 427      ( 15 May 2018 07:26 )             27.3       05-15    153<H>  |  109  |  4<L>  ----------------------------<  106<H>  3.7   |  29  |  0.6<L>    Ca    7.7<L>      15 May 2018 13:35  Phos  1.1     05-15  Mg     2.2     05-15    TPro  5.0<L>  /  Alb  2.6<L>  /  TBili  <0.2  /  DBili  x   /  AST  13  /  ALT  6   /  AlkPhos  62  05-15            LIVER FUNCTIONS - ( 15 May 2018 07:26 )  Alb: 2.6 g/dL / Pro: 5.0 g/dL / ALK PHOS: 62 U/L / ALT: 6 U/L / AST: 13 U/L / GGT: x                   Radiology: chronic b/l L>R infiltrates, atelectasis worsening    MEDICATIONS  (STANDING):  ALBUTerol/ipratropium for Nebulization 3 milliLiter(s) Nebulizer every 6 hours  carBAMazepine 200 milliGRAM(s) Oral two times a day  dextrose 5%. 1000 milliLiter(s) (100 mL/Hr) IV Continuous <Continuous>  fentaNYL   Patch  75 MICROgram(s)/Hr 1 Patch Transdermal every 72 hours  heparin  Injectable 5000 Unit(s) SubCutaneous every 12 hours  levETIRAcetam  IVPB 375 milliGRAM(s) IV Intermittent every 12 hours    MEDICATIONS  (PRN):  acetaminophen   Tablet. 650 milliGRAM(s) Oral every 6 hours PRN Moderate Pain (4 - 6)  acetaminophen  Suppository. 650 milliGRAM(s) Rectal every 8 hours PRN Mild Pain (1 - 3)  metoprolol tartrate Injectable 5 milliGRAM(s) IV Push every 6 hours PRN elevated HR  morphine  - Injectable 2 milliGRAM(s) IV Push every 3 hours PRN Severe Pain (7 - 10)  ondansetron Injectable 4 milliGRAM(s) IV Push every 6 hours PRN Nausea and/or Vomiting  zolpidem 5 milliGRAM(s) Enteral Tube at bedtime PRN Insomnia      PHYSICAL EXAM:  GENERAL: NAD, well-groomed, well-developed  HEAD:  Atraumatic, Normocephalic  EYES: EOMI, PERRLA, conjunctiva and sclera clear  NERVOUS SYSTEM:  Alert & Oriented X 4, Good concentration; Motor Strength 5/5 B/L upper and lower extremities; DTRs 2+ intact and symmetric  CHEST/LUNG: ronchi bilaterally; ++ wet cough though minimal cough effort  HEART: Regular rate and rhythm; No murmurs, rubs, or gallops  ABDOMEN: Soft, Nontender, ++distended; Bowel sounds decreased  EXTREMITIES:  2+ Peripheral Pulses, No clubbing, cyanosis, or edema  SKIN: No rashes or lesions

## 2018-05-16 NOTE — PROGRESS NOTE ADULT - SUBJECTIVE AND OBJECTIVE BOX
Patient is a 82y old  Female who presents with a chief complaint of     HPI:  82 years old female coming from [facility] c/o distended abdomen and coffe ground vomit for 2 days. (07 May 2018 23:32)      INTERVAL HPI/OVERNIGHT EVENTS: Pt seen and examined at bedside, in NAD. Denies N/V/D, abdominal pain, CP, SOB, palpitations, fevers.  Not much change Pt lethargic.  she pulled out the NGT        REVIEW OF SYSTEMS:  CONSTITUTIONAL: No fever, weight loss, or fatigue  EYES: No eye pain, visual disturbances, or discharge  ENMT:  No difficulty hearing, tinnitus, vertigo; No sinus or throat pain  NECK: No pain or stiffness  BREASTS: No pain, no masses,   RESPIRATORY: No cough, wheezing, chills or hemoptysis; No shortness of breath  CARDIOVASCULAR: No chest pain, palpitations, dizziness, or leg swelling  GASTROINTESTINAL: No abdominal or epigastric pain. No nausea, vomiting, or hematemesis; No diarrhea or constipation. No melena or hematochezia.  GENITOURINARY: No dysuria, frequency, hematuria, or incontinence  NEUROLOGICAL: No headaches, memory loss, loss of strength, numbness, or tremors  SKIN: No itching, burning, rashes, or lesions   LYMPH NODES: No enlarged glands  MUSCULOSKELETAL: No joint pain or swelling; No muscle, back, or extremity pain  PSYCHIATRIC: No depression, anxiety, mood swings, or difficulty sleeping  ALLERY No hives or eczema    PHYSICAL EXAM:  GENERAL: NAD, well-groomed, well-developed  HEAD:  Atraumatic, Normocephalic  EYES: EOMI, PERRLA, conjunctiva and sclera clear  ENMT: No tonsillar erythema, exudates, or enlargement; Moist mucous membranes, Good dentition, No lesions  NECK: Supple, No JVD, Normal thyroid  NERVOUS SYSTEM:  Alert & Oriented X3, Good concentration; Motor Strength 5/5 B/L upper and lower extremities; DTRs 2+ intact and symmetric  CHEST/LUNG: Clear to percussion bilaterally; No rales, rhonchi, wheezing, or rubs  HEART: Regular rate and rhythm; No murmurs, rubs, or gallops  ABDOMEN: Soft, Nontender, Less distended; Bowel sounds present  EXTREMITIES:  2+ Peripheral Pulses, No clubbing, cyanosis, or edema  LYMPH: No lymphadenopathy noted  SKIN: No rashes or lesions    Vital Signs Last 24 Hrs  T(C): 35.4 (16 May 2018 14:16), Max: 36.2 (15 May 2018 22:01)  T(F): 95.8 (16 May 2018 14:16), Max: 97.1 (15 May 2018 22:01)  HR: 122 (16 May 2018 14:16) (113 - 122)  BP: 133/64 (16 May 2018 14:16) (108/65 - 166/88)  BP(mean): --  RR: 16 (16 May 2018 14:16) (16 - 16)  SpO2: 94% (16 May 2018 07:20) (94% - 94%)      MEDICATIONS  (STANDING):  ALBUTerol/ipratropium for Nebulization 3 milliLiter(s) Nebulizer every 6 hours  carBAMazepine 200 milliGRAM(s) Oral two times a day  dextrose 5%. 1000 milliLiter(s) (100 mL/Hr) IV Continuous <Continuous>  fentaNYL   Patch  75 MICROgram(s)/Hr 1 Patch Transdermal every 72 hours  heparin  Injectable 5000 Unit(s) SubCutaneous every 12 hours  levETIRAcetam  IVPB 375 milliGRAM(s) IV Intermittent every 12 hours    MEDICATIONS  (PRN):  acetaminophen   Tablet. 650 milliGRAM(s) Oral every 6 hours PRN Moderate Pain (4 - 6)  acetaminophen  Suppository. 650 milliGRAM(s) Rectal every 8 hours PRN Mild Pain (1 - 3)  metoprolol tartrate Injectable 5 milliGRAM(s) IV Push every 6 hours PRN elevated HR  morphine  - Injectable 2 milliGRAM(s) IV Push every 3 hours PRN Severe Pain (7 - 10)  ondansetron Injectable 4 milliGRAM(s) IV Push every 6 hours PRN Nausea and/or Vomiting  zolpidem 5 milliGRAM(s) Enteral Tube at bedtime PRN Insomnia      LABS:                          8.8    13.24 )-----------( 477      ( 16 May 2018 09:15 )             28.9     05-16    144  |  101  |  6<L>  ----------------------------<  143<H>  4.0   |  27  |  0.6<L>    Ca    7.7<L>      16 May 2018 09:15  Phos  1.1     05-15  Mg     2.2     05-15    TPro  5.1<L>  /  Alb  2.7<L>  /  TBili  <0.2  /  DBili  x   /  AST  13  /  ALT  6   /  AlkPhos  69  05-16    LIVER FUNCTIONS - ( 16 May 2018 09:15 )  Alb: 2.7 g/dL / Pro: 5.1 g/dL / ALK PHOS: 69 U/L / ALT: 6 U/L / AST: 13 U/L / GGT: x           PT/INR - ( 16 May 2018 09:15 )   PT: 17.80 sec;   INR: 1.63 ratio         PTT - ( 16 May 2018 09:15 )  PTT:27.6 sec                  RADIOLOGY & ADDITIONAL TESTS:    Imaging Personally Reviewed:       Advance Directives:      Palliative Care: Pt on hospice care. Seems comfortable w/o complaint    Rec: present regimen

## 2018-05-16 NOTE — PROGRESS NOTE ADULT - ATTENDING COMMENTS
abdomen soft distension positive having bms
above noted abdomen soft distension
above noted abdomen soft for colonoscopy
above noted abdomen soft distension discussed case with DR LARSON and pts son
above noted abdomen soft distension  for ct scan
abdomen soft discussed case with dr dixon and pts son

## 2018-05-16 NOTE — CONSULT NOTE ADULT - ASSESSMENT
Patient with history CVA. She is nonverbal. No overt CHF or angina. Her alb is low. She needs a CXR. Her risk of surgery appears moderate. But need check CXR

## 2018-05-17 NOTE — PROGRESS NOTE ADULT - SUBJECTIVE AND OBJECTIVE BOX
NEPHROLOGY FOLLOW UP:    Pt seen and examined.  No new events.  Pt lethargic and unable to give history.  Still npo on ivf's    PAST MEDICAL & SURGICAL HISTORY:  Osteoporosis  COPD (chronic obstructive pulmonary disease)  High cholesterol  Colitis  Depression  Hypertension  Anemia    Allergies:  Bactrim (Unknown)  sulfa drugs (Unknown)  tetanus immune globulin (Unknown)    Home Medications Reviewed    SOCIAL HISTORY:  Denies ETOH,Smoking,   FAMILY HISTORY:        REVIEW OF SYSTEMS:    All other review of systems is negative unless indicated above.    PHYSICAL EXAM:  shallow respirations  dry mm  no jvd  cta bl  rrr  soft, nt, + bs  no brand  no edema  + left arm [facility]            Hospital Medications:   MEDICATIONS  (STANDING):  ALBUTerol/ipratropium for Nebulization 3 milliLiter(s) Nebulizer every 6 hours  carBAMazepine 200 milliGRAM(s) Oral two times a day  dextrose 5% + sodium chloride 0.45%. 1000 milliLiter(s) (60 mL/Hr) IV Continuous <Continuous>  heparin  Injectable 5000 Unit(s) SubCutaneous every 12 hours  levETIRAcetam  IVPB 375 milliGRAM(s) IV Intermittent every 12 hours        VITALS:  T(F): 96.5 (05-17-18 @ 14:00), Max: 96.5 (05-16-18 @ 22:21)  HR: 122 (05-17-18 @ 14:00)  BP: 142/89 (05-17-18 @ 14:00)  RR: 16 (05-17-18 @ 05:07)  SpO2: 94% (05-17-18 @ 11:23)  Wt(kg): --    05-15 @ 07:01  -  05-16 @ 07:00  --------------------------------------------------------  IN: 1000 mL / OUT: 300 mL / NET: 700 mL    05-16 @ 07:01  -  05-17 @ 07:00  --------------------------------------------------------  IN: 0 mL / OUT: 375 mL / NET: -375 mL          LABS:  05-16    144  |  101  |  6<L>  ----------------------------<  143<H>  4.0   |  27  |  0.6<L>    Ca    7.7<L>      16 May 2018 09:15    TPro  5.1<L>  /  Alb  2.7<L>  /  TBili  <0.2  /  DBili      /  AST  13  /  ALT  6   /  AlkPhos  69  05-16                          8.8    13.24 )-----------( 477      ( 16 May 2018 09:15 )             28.9       Urine Studies:        RADIOLOGY & ADDITIONAL STUDIES:

## 2018-05-17 NOTE — PROGRESS NOTE ADULT - SUBJECTIVE AND OBJECTIVE BOX
Chart reviewed, patient examined. Pertinent results reviewed.  Pt refusing most Rx now; Hospice has been consulted  KARINA CUNNINGHAM  82y  Female      Patient is a 82y old  Female who presents with a chief complaint of vomitting,now pt refused NG,unable to breathe;   becoming lethargic      REVIEW OF SYSTEMS:  CONSTITUTIONAL: No fever, weight loss,   EYES: No eye pain, visual disturbances, or discharge  ENMT:  No difficulty hearing, tinnitus, vertigo; No sinus or throat pain  NECK: No pain or stiffness  BREASTS: No pain, masses, or nipple discharge  RESPIRATORY: No cough, wheezing, chills or hemoptysis; sob+  CARDIOVASCULAR: No chest pain, palpitations, dizziness, or leg swelling  GASTROINTESTINAL: distended abdomen  GENITOURINARY: No dysuria, frequency, hematuria, or incontinence  NEUROLOGICAL: No headaches, memory loss, loss of strength, numbness, or tremors  SKIN: No itching, burning, rashes, or lesions   LYMPH NODES: No enlarged glands  ENDOCRINE: No heat or cold intolerance; No hair loss  MUSCULOSKELETAL: No joint pain or swelling; No muscle, back, or extremity pain  PSYCHIATRIC: No depression, anxiety, mood swings, or difficulty sleeping  HEME/LYMPH: No easy bruising, or bleeding gums  ALLERY AND IMMUNOLOGIC: No hives or eczema  FAMILY HISTORY:  Vital Signs Last 24 Hrs  T(C): 35.8 (17 May 2018 05:07), Max: 35.8 (16 May 2018 22:21)  T(F): 96.5 (17 May 2018 05:07), Max: 96.5 (16 May 2018 22:21)  HR: 116 (17 May 2018 05:07) (113 - 122)  BP: 133/80 (17 May 2018 05:07) (133/64 - 139/72)  BP(mean): --  RR: 16 (17 May 2018 05:07) (16 - 16)  SpO2: 94% (17 May 2018 11:23) (94% - 94%)Bactrim (Unknown)  sulfa drugs (Unknown)  tetanus immune globulin (Unknown)      PHYSICAL EXAM:  GENERAL: NAD, obese , lethargic-barely opens eyes  HEAD:  Atraumatic, Normocephalic  EYES: EOMI, PERRLA, conjunctiva and sclera clear  ENMT: No tonsillar erythema, exudates, or enlargement;   NECK: Supple, No JVD, Normal thyroid  NERVOUS SYSTEM:  Alert &   CHEST/LUNG: Clear to percussion bilaterally; No rales, rhonchi, wheezing, or rubs  HEART: Regular rate and rhythm; No murmurs, rubs, or gallops  ABDOMEN: Soft, distended+  EXTREMITIES:  2+ Peripheral Pulses, No clubbing, cyanosis, edema+  LYMPH: No lymphadenopathy noted  SKIN: No rashes or lesions      LABS:                      8.8    13.24 )-----------( 477      ( 16 May 2018 09:15 )             28.9   05-16    144  |  101  |  6<L>  ----------------------------<  143<H>  4.0   |  27  |  0.6<L>    Ca    7.7<L>      16 May 2018 09:15    TPro  5.1<L>  /  Alb  2.7<L>  /  TBili  <0.2  /  DBili  x   /  AST  13  /  ALT  6   /  AlkPhos  69  05-16    05-15    153<H>  |  109  |  4<L>  ----------------------------<  106<H>  3.7   |  29  |  0.6<L>    Ca    7.7<L>      15 May 2018 13:35  Phos  1.1     05-15  Mg     2.2     05-15    TPro  5.0<L>  /  Alb  2.6<L>  /  TBili  <0.2  /  DBili  x   /  AST  13  /  ALT  6   /  AlkPhos  62  05-15                          8.2    10.15 )-----------( 427      ( 15 May 2018 07:26 )             27.3         RADIOLOGY & ADDITIONAL TESTS:    MEDICATION:  acetaminophen   Tablet. 650 milliGRAM(s) Oral every 6 hours PRN  acetaminophen  Suppository. 650 milliGRAM(s) Rectal every 8 hours PRN  ALBUTerol/ipratropium for Nebulization 3 milliLiter(s) Nebulizer every 6 hours  carBAMazepine 200 milliGRAM(s) Oral two times a day  dextrose 5%. 1000 milliLiter(s) IV Continuous <Continuous>  fentaNYL   Patch  75 MICROgram(s)/Hr 1 Patch Transdermal every 72 hours  heparin  Injectable 5000 Unit(s) SubCutaneous every 12 hours  levETIRAcetam  IVPB 375 milliGRAM(s) IV Intermittent every 12 hours  metoprolol tartrate Injectable 5 milliGRAM(s) IV Push every 6 hours PRN  morphine  - Injectable 2 milliGRAM(s) IV Push every 3 hours PRN  ondansetron Injectable 4 milliGRAM(s) IV Push every 6 hours PRN  zolpidem 5 milliGRAM(s) Enteral Tube at bedtime PRN      HEALTH ISSUES - PROBLEM Dx:  Bowel obstruction-2/2 mass-refusing RX  Anemia: Anemia  Hypertension: Hypertension lopressor  Aspiration pneumonia, unspecified aspiration pneumonia type, unspecified laterality, unspecified part of lung: Aspiration pneumonia, unspecified aspiration pneumonia type, unspecified laterality, unspecified part of lung  Colon obstruction: Colon obstruction,might ?? colostomy; vs hospice/comfort care  Complete intestinal obstruction, unspecified cause: Complete intestinal obstruction, unspecified cause,pt is refusing NG  Colon neoplasm: Colon neoplasm  Aspiration pneumonia: Aspiration pneumonia s/p zosyn    case d/w son about Ct scan finding,?colon mass,Gallbladder mass,currently pt is in SOB,probably due to low albumin 2.6,npointravenous fluids,pt is high risk to go for surgery,will call pulmonary consult,CXR   hypernatremia on d5w,overall poor prognosis

## 2018-05-18 NOTE — PROGRESS NOTE ADULT - SUBJECTIVE AND OBJECTIVE BOX
Patient is a 82y old  Female who presents with a chief complaint of     HPI:  82 years old female coming from [facility] c/o distended abdomen and coffe ground vomit for 2 days. (07 May 2018 23:32)      INTERVAL HPI/OVERNIGHT EVENTS: Pt seen and examined at bedside, in NAD. Denies N/V/D, abdominal pain, CP, SOB, palpitations, fevers.  Not much change Pt lethargic.  she pulled out the NGT        REVIEW OF SYSTEMS:  CONSTITUTIONAL: No fever, weight loss, or fatigue  EYES: No eye pain, visual disturbances, or discharge  ENMT:  No difficulty hearing, tinnitus, vertigo; No sinus or throat pain  NECK: No pain or stiffness  BREASTS: No pain, no masses,   RESPIRATORY: No cough, wheezing, chills or hemoptysis; No shortness of breath  CARDIOVASCULAR: No chest pain, palpitations, dizziness, or leg swelling  GASTROINTESTINAL: No abdominal or epigastric pain. No nausea, vomiting, or hematemesis; No diarrhea or constipation. No melena or hematochezia.  GENITOURINARY: No dysuria, frequency, hematuria, or incontinence  NEUROLOGICAL: No headaches, memory loss, loss of strength, numbness, or tremors  SKIN: No itching, burning, rashes, or lesions   LYMPH NODES: No enlarged glands  MUSCULOSKELETAL: No joint pain or swelling; No muscle, back, or extremity pain  PSYCHIATRIC: No depression, anxiety, mood swings, or difficulty sleeping  ALLERY No hives or eczema    PHYSICAL EXAM:    Vital Signs Last 24 Hrs  T(C): 36.8 (18 May 2018 14:52), Max: 38.4 (18 May 2018 01:15)  T(F): 98.3 (18 May 2018 14:52), Max: 101.2 (18 May 2018 01:15)  HR: 120 (18 May 2018 14:52) (109 - 132)  BP: 73/43 (18 May 2018 14:52) (73/43 - 105/53)  BP(mean): --  RR: 18 (18 May 2018 05:10) (18 - 22)  SpO2: 100% (18 May 2018 10:19) (95% - 100%)    GENERAL: NAD, well-groomed, well-developed  HEAD:  Atraumatic, Normocephalic  EYES: EOMI, PERRLA, conjunctiva and sclera clear  ENMT: No tonsillar erythema, exudates, or enlargement; Moist mucous membranes, Good dentition, No lesions  NECK: Supple, No JVD, Normal thyroid  NERVOUS SYSTEM:  Alert & Oriented X3, Good concentration; Motor Strength 5/5 B/L upper and lower extremities; DTRs 2+ intact and symmetric  CHEST/LUNG: Clear to percussion bilaterally; No rales, rhonchi, wheezing, or rubs  HEART: Regular rate and rhythm; No murmurs, rubs, or gallops  ABDOMEN: Soft, Nontender, distended; Bowel sounds present  EXTREMITIES:  2+ Peripheral Pulses, No clubbing, cyanosis, + edema  LYMPH: No lymphadenopathy noted  SKIN: No rashes or lesions        MEDICATIONS  (STANDING):  ALBUTerol/ipratropium for Nebulization 3 milliLiter(s) Nebulizer every 6 hours  carBAMazepine 200 milliGRAM(s) Oral two times a day  dextrose 5%. 1000 milliLiter(s) (100 mL/Hr) IV Continuous <Continuous>  fentaNYL   Patch  75 MICROgram(s)/Hr 1 Patch Transdermal every 72 hours  heparin  Injectable 5000 Unit(s) SubCutaneous every 12 hours  levETIRAcetam  IVPB 375 milliGRAM(s) IV Intermittent every 12 hours    MEDICATIONS  (PRN):  acetaminophen   Tablet. 650 milliGRAM(s) Oral every 6 hours PRN Moderate Pain (4 - 6)  acetaminophen  Suppository. 650 milliGRAM(s) Rectal every 8 hours PRN Mild Pain (1 - 3)  metoprolol tartrate Injectable 5 milliGRAM(s) IV Push every 6 hours PRN elevated HR  morphine  - Injectable 2 milliGRAM(s) IV Push every 3 hours PRN Severe Pain (7 - 10)  ondansetron Injectable 4 milliGRAM(s) IV Push every 6 hours PRN Nausea and/or Vomiting  zolpidem 5 milliGRAM(s) Enteral Tube at bedtime PRN Insomnia

## 2018-05-18 NOTE — PROGRESS NOTE ADULT - SUBJECTIVE AND OBJECTIVE BOX
KARINA CUNNINGHAM  82y  Female      Patient is a 82y old  Female who presents with a chief complaint of fever+,incoherant,      REVIEW OF SYSTEMS:  CONSTITUTIONAL: fever+  EYES: No eye pain, visual disturbances, or discharge  ENMT:  No difficulty hearing, tinnitus, vertigo; No sinus or throat pain  NECK: No pain or stiffness  BREASTS: No pain, masses, or nipple discharge  RESPIRATORY: No cough, wheezing, chills or hemoptysis; No shortness of breath  CARDIOVASCULAR: No chest pain, palpitations, dizziness, or leg swelling  GASTROINTESTINAL: distended abdomen  GENITOURINARY: No dysuria, frequency, hematuria, or incontinence  NEUROLOGICAL: No headaches, memory loss, loss of strength, numbness, or tremors  SKIN: No itching, burning, rashes, or lesions   LYMPH NODES: No enlarged glands  ENDOCRINE: No heat or cold intolerance; No hair loss  MUSCULOSKELETAL: No joint pain or swelling; No muscle, back, or extremity pain  PSYCHIATRIC: No depression, anxiety, mood swings, or difficulty sleeping  HEME/LYMPH: No easy bruising, or bleeding gums  ALLERY AND IMMUNOLOGIC: No hives or eczema  FAMILY HISTORY:  No pertinent family history in first degree relatives    T(C): 37.6 (05-18-18 @ 05:10), Max: 38.4 (05-18-18 @ 01:15)  HR: 109 (05-18-18 @ 05:10) (109 - 132)  BP: 81/41 (05-18-18 @ 05:10) (81/41 - 142/89)  RR: 18 (05-18-18 @ 05:10) (18 - 22)  SpO2: 95% (05-18-18 @ 01:15) (94% - 95%)  Wt(kg): --Vital Signs Last 24 Hrs  T(C): 37.6 (18 May 2018 05:10), Max: 38.4 (18 May 2018 01:15)  T(F): 99.7 (18 May 2018 05:10), Max: 101.2 (18 May 2018 01:15)  HR: 109 (18 May 2018 05:10) (109 - 132)  BP: 81/41 (18 May 2018 05:10) (81/41 - 142/89)  BP(mean): --  RR: 18 (18 May 2018 05:10) (18 - 22)  SpO2: 95% (18 May 2018 01:15) (94% - 95%)  Bactrim (Unknown)  sulfa drugs (Unknown)  tetanus immune globulin (Unknown)      PHYSICAL EXAM:  GENERAL: NAD,   HEAD:  Atraumatic, Normocephalic  EYES: EOMI, PERRLA, conjunctiva and sclera clear  ENMT: No tonsillar erythema, exudates, or enlargement;  NECK: Supple, No JVD, Normal thyroid  NERVOUS SYSTEM:  sleepy,lethargy  CHEST/LUNG: vbs bilateral rales+  HEART: Regular rate and rhythm; No murmurs, rubs, or gallops  ABDOMEN: Soft, distended+  EXTREMITIES:  edema+,anarsaca  LYMPH: No lymphadenopathy noted  SKIN: No rashes or lesions      LABS:  05-16    144  |  101  |  6<L>  ----------------------------<  143<H>  4.0   |  27  |  0.6<L>    Ca    7.7<L>      16 May 2018 09:15    TPro  5.1<L>  /  Alb  2.7<L>  /  TBili  <0.2  /  DBili  x   /  AST  13  /  ALT  6   /  AlkPhos  69  05-16                          8.8    13.24 )-----------( 477      ( 16 May 2018 09:15 )             28.9         RADIOLOGY & ADDITIONAL TESTS:    MEDICATION:  acetaminophen   Tablet. 650 milliGRAM(s) Oral every 6 hours PRN  acetaminophen  Suppository. 650 milliGRAM(s) Rectal every 8 hours PRN  ALBUTerol/ipratropium for Nebulization 3 milliLiter(s) Nebulizer every 6 hours  carBAMazepine 200 milliGRAM(s) Oral two times a day  cefepime   IVPB 1000 milliGRAM(s) IV Intermittent every 12 hours  dextrose 5% + sodium chloride 0.45%. 1000 milliLiter(s) IV Continuous <Continuous>  heparin  Injectable 5000 Unit(s) SubCutaneous every 12 hours  levETIRAcetam  IVPB 375 milliGRAM(s) IV Intermittent every 12 hours  metoprolol tartrate Injectable 5 milliGRAM(s) IV Push every 6 hours PRN  morphine  - Injectable 2 milliGRAM(s) IV Push every 3 hours PRN  ondansetron Injectable 4 milliGRAM(s) IV Push every 6 hours PRN      HEALTH ISSUES - PROBLEM Dx:  Anemia: Anemia chronic inflammatory  Hypertension: Hypertension  Aspiration pneumonia, unspecified aspiration pneumonia type, unspecified laterality, unspecified part of lung: Aspiration pneumonia, unspecified aspiration pneumonia type, unspecified laterality, unspecified part of lung start on cefipime,s/p zosyn  Colon obstruction: Colon obstruction ,pt refused for NG  Complete intestinal obstruction, unspecified cause: Complete intestinal obstruction, unspecified cause  Colon neoplasm: Colon neoplasm,unable to do colonoscopy,pt is terminal condition  Aspiration pneumonia: Aspiration pneumonia  generalised anarsacar ,malnutrition ,pt is terminal,will call Hospice

## 2018-05-18 NOTE — PROGRESS NOTE ADULT - SUBJECTIVE AND OBJECTIVE BOX
NEPHROLOGY FOLLOW UP:    Pt continues to do poorly.  Still npo on ivf's.  no recent labs.  very hypotensive and lethargic    PAST MEDICAL & SURGICAL HISTORY:  Osteoporosis  COPD (chronic obstructive pulmonary disease)  High cholesterol  Colitis  Depression  Hypertension  Anemia    Allergies:  Bactrim (Unknown)  sulfa drugs (Unknown)  tetanus immune globulin (Unknown)    Home Medications Reviewed    SOCIAL HISTORY:  Denies ETOH,Smoking,   FAMILY HISTORY:        REVIEW OF SYSTEMS:    All other review of systems is negative unless indicated above.    PHYSICAL EXAM:  shallow respirations  dry mm  no jvd  cta bl  rrr  soft, nt, + bs  no brand  no edema  + left arm [facility]            Hospital Medications:   MEDICATIONS  (STANDING):  ALBUTerol/ipratropium for Nebulization 3 milliLiter(s) Nebulizer every 6 hours  carBAMazepine 200 milliGRAM(s) Oral two times a day  cefepime   IVPB 1000 milliGRAM(s) IV Intermittent every 12 hours  dextrose 5% + sodium chloride 0.45%. 1000 milliLiter(s) (40 mL/Hr) IV Continuous <Continuous>  heparin  Injectable 5000 Unit(s) SubCutaneous every 12 hours  levETIRAcetam  IVPB 375 milliGRAM(s) IV Intermittent every 12 hours        VITALS:  T(F): 98.3 (05-18-18 @ 14:52), Max: 101.2 (05-18-18 @ 01:15)  HR: 120 (05-18-18 @ 14:52)  BP: 73/43 (05-18-18 @ 14:52)  RR: 18 (05-18-18 @ 05:10)  SpO2: 100% (05-18-18 @ 10:19)  Wt(kg): --    05-16 @ 07:01  -  05-17 @ 07:00  --------------------------------------------------------  IN: 0 mL / OUT: 375 mL / NET: -375 mL    05-17 @ 07:01  -  05-18 @ 07:00  --------------------------------------------------------  IN: 0 mL / OUT: 75 mL / NET: -75 mL

## 2018-05-18 NOTE — PROGRESS NOTE ADULT - ASSESSMENT
83 yo female with small and large bowel obstruction possibly sec to a colon mass.
Colonic obstruction secondary to most likely cancer with mets to liver  Unable to do colonoscopy due to prep  Patient refusing NGT    Plan    Patient is high risk from pulmonary standpoint  Patient on Hospice  Will s/o now recall as needed
Impression:  Infiltrates/atelectasis B/L L>R likely due to aspiration  abdominal distention  Hypernatremia due to free H2O deficit  Impending respiratory failure    Suggest:    Hospice apparently refused  I would institute narcotics for dyspnea relief. Patient in extremes  DNR/ DNI    Prognosis poor even with aggressive care.
R/O descending colon obstruction    Plan:    NGT clamped  If tolerates repeat KUB in AM and consider starting clears  Will consider colonoscopy next week  If obstruction resolves the prep and do colonoscopy  Will d/w surgical team
R/O descending colon obstruction  Abdominal distension    Plan:    Continue NGT suction  Check CT scan  Can have ice chips  I don't think I will be able to do a colonoscopy on this patient at this time  If obstruction resolves then will prep and do colonoscopy  Will d/w surgical team
81yo F with suspected obstructing colon mass. Patient remains obstructed clinically and on xray. Patient refusing any further w/u intervention right now. Will need to d/w family and DR Monk
Abdominal Pain, Abdominal Distention, Obstruction, hx of Colon Cancer  - remains NPO  - GI and Surgical notes appreciated  - NGT back in place  - f/u xrays noted  - nutrition evaluation Dr Mercedes for TPN until dx made    COPD with suspected Gram negative/aspiration pneumonia   - PICC line in place  - continue IV antibiotics as per ID  - respiratory treatment as needed  - WBC decreased, temperature down    Hypernatremia  - resolved    Continue supportive measures. MOLST form on chart.    Consider Palliative care evaluation
Abdominal Pain, Abdominal Distention, Obstruction, hx of Colon Cancer  - remains NPO  - GI and Surgical notes appreciated  - NGT in place  - f/u xrays noted  - consider nutrition evaluation for TPN vs PPN until dx made    COPD with suspected Gram negative/aspiration pneumonia   - PICC line in place  - continue IV antibiotics as per ID  - respiratory treatment as needed    Hypernatremia  - resolved    Continue supportive measures. MOLST form on chart.
Colonic obstruction secondary to most likely cancer with mets to liver  Unable to do colonoscopy due to prep  Patient refusing NGT    Plan    Patient is high risk from pulmonary standpoint  Colostomy would be a good choice for decompresssion, if family agrees  Will follow
Hypernatremia   Colon obstruction due to colon mass (likely)  malnutrition  hypotension  impending resp failure    plan:    cont gentle current ivf's  son declining palliative colostomy  poor prognosis  dnr / dni  hospice f/u  palliative care focus
Hypernatremia  Colon obstruction due to colon mass    plan:    change to d5 1/2ns @ 60 cc/hr while inpt  son declining palliative colostomy  poor prognosis  dnr / dni  hospice consult
Hypernatremia  Colon obstruction due to colon mass    plan:    con't D5W at 100 cc/hr  if overt hypotension, give NS 500cc bolus  hold hydralazine  cont lopressor  poor prognosis  dnr / dni  hospice consult
Hypernatremia - resolved  Colon obstruction due to colon mass (likely)  malnutrition    plan:    cont gentle current ivf's  son declining palliative colostomy  poor prognosis  dnr / dni  hospice f/u  palliative care focus
IMPRESSION  Pt from nursing home with COPD with suspected Gram negative/aspiration pneumonia & Intestinal obstruction (history Colon CA)    Liver lesion on CT, resolved hypernatremia    Tmax 99.8; wbc 21.84    On piperacillin/tazobactam 3.375 Gram(s) IV Intermittent every 8 hours    SUGGESTIONs  Monitor Abd sono as per GI  Continue Zosyn  Repeat white blood cell count  Suction for Sputum C&S and Gram stain  CHG 4% washes daily & PRN
R/O Probable large bowel obstruction ?  Feels better today  Sepsis ? etiology  Discussed with son on how agressive w/u an Rx - deferred to brother Conrado    Rec.   NG tube to low Goo   Repeat abdo in AM  Tap water enemas q12
R/O descending colon obstruction    1. Continue NGT clamped for now  2. F/U obstructive series today. Per GI, if improved, consider clear liquids. Possible colonoscopy next week if obstruction resolves  3. Will continue to follow
R/O descending colon obstruction    Plan:  Attempt colonoscopy tomorrow         Pt should get TWE's tonight and again in AM  NPO to continue
R/O descending colon obstruction  Abdominal distension    Plan:    Continue NGT suction  Can have ice chips  I don't think I will be able to do a colonoscopy on this patient at this time  If obstruction resolves the prep and do colonoscopy  Will d/w surgical team
SB. LB obstruction poss colonic mass    - cont npo   - cont ngt   - oob to chair   - await GI for scope
Colon mass with obstruction    Pt refused CT scan today  Will D/W Dr. Monk regarding NGt/colostomy

## 2018-05-18 NOTE — PROVIDER CONTACT NOTE (OTHER) - SITUATION
As per LILLIAN will leave fentanyl patch on
[pt has fentanyl patch on. low bp
Pt noted with labored respirations, PO on 4LNC, Po 94%, Pa Amilcar notified and into see pt, will continue to monitor.
Pt pulled out Ng Tube, Sherman Jacques and Dr. Monroe Ervin notified.
brand renewal
pt DNR/DNI admitted for bowel obstruction and mass refusing all medical interventions BP 73/43

## 2018-05-18 NOTE — PROVIDER CONTACT NOTE (OTHER) - RECOMMENDATIONS
do not take fentanyl patch place on rcw
pa awaiting for xrays results, and pt has a low bp
please renew cathie

## 2018-05-19 NOTE — PROGRESS NOTE ADULT - PROVIDER SPECIALTY LIST ADULT
Gastroenterology
Infectious Disease
Infectious Disease
Internal Medicine
Nephrology
Surgery
Internal Medicine
Internal Medicine
Pulmonology
Gastroenterology
Surgery

## 2018-05-19 NOTE — PROGRESS NOTE ADULT - SUBJECTIVE AND OBJECTIVE BOX
KARINA CUNNINGHAM  82y  Female      Patient is a 82y old  Female who presents with a chief complaint of   now pt is in sob,heavy breathing,incoherant    REVIEW OF SYSTEMS:  CONSTITUTIONAL: No fever, weight loss, or fatigue  EYES: No eye pain, visual disturbances, or discharge  ENMT: nad  NECK: No pain or stiffness  BREASTS: No pain, masses, or nipple discharge  RESPIRATORY: decreased breath sound,sob+  CARDIOVASCULAR: leg swelling+  GASTROINTESTINAL: distended  GENITOURINARY: obliguria+  NEUROLOGICAL: No headaches, memory loss, loss of strength, numbness, or tremors  SKIN: No itching, burning, rashes, or lesions   LYMPH NODES: No enlarged glands  ENDOCRINE: No heat or cold intolerance; No hair loss  MUSCULOSKELETAL: n/a  PSYCHIATRIC: n/a  HEME/LYMPH: No easy bruising, or bleeding gums  ALLERY AND IMMUNOLOGIC: No hives or eczema  FAMILY HISTORY:  No pertinent family history in first degree relatives    T(C): 36.4 (05-18-18 @ 22:08), Max: 36.8 (05-18-18 @ 14:52)  HR: 116 (05-18-18 @ 22:08) (116 - 127)  BP: 67/42 (05-18-18 @ 22:08) (67/42 - 105/53)  RR: 19 (05-18-18 @ 22:08) (19 - 19)  SpO2: 100% (05-18-18 @ 10:19) (100% - 100%)  Wt(kg): --Vital Signs Last 24 Hrs  T(C): 36.4 (18 May 2018 22:08), Max: 36.8 (18 May 2018 14:52)  T(F): 97.6 (18 May 2018 22:08), Max: 98.3 (18 May 2018 14:52)  HR: 116 (18 May 2018 22:08) (116 - 127)  BP: 67/42 (18 May 2018 22:08) (67/42 - 105/53)  BP(mean): --  RR: 19 (18 May 2018 22:08) (19 - 19)  SpO2: 100% (18 May 2018 10:19) (100% - 100%)  Bactrim (Unknown)  sulfa drugs (Unknown)  tetanus immune globulin (Unknown)      PHYSICAL EXAM:  GENERAL: NAD,   HEAD:  Atraumatic, Normocephalic  EYES: EOMI, PERRLA, conjunctiva and sclera clear  ENMT: Nad  NECK: Supple, No JVD, Normal thyroid  NERVOUS SYSTEM:  lethargy  CHEST/LUNG: decreased breath sound  HEART: Regular rate and rhythm; No murmurs, rubs, or gallops  ABDOMEN: disended+,bs+  EXTREMITIES:  edema+  LYMPH: No lymphadenopathy noted  SKIN: No rashes or lesions      LABS:              RADIOLOGY & ADDITIONAL TESTS:    MEDICATION:  acetaminophen   Tablet. 650 milliGRAM(s) Oral every 6 hours PRN  acetaminophen  Suppository. 650 milliGRAM(s) Rectal every 8 hours PRN  ALBUTerol/ipratropium for Nebulization 3 milliLiter(s) Nebulizer every 6 hours  carBAMazepine 200 milliGRAM(s) Oral two times a day  cefepime   IVPB 1000 milliGRAM(s) IV Intermittent every 12 hours  heparin  Injectable 5000 Unit(s) SubCutaneous every 12 hours  levETIRAcetam  IVPB 375 milliGRAM(s) IV Intermittent every 12 hours  metoprolol tartrate Injectable 5 milliGRAM(s) IV Push every 6 hours PRN  morphine  - Injectable 2 milliGRAM(s) IV Push every 3 hours PRN  morphine  Infusion 2 mG/Hr IV Continuous <Continuous>  ondansetron Injectable 4 milliGRAM(s) IV Push every 6 hours PRN      HEALTH ISSUES - PROBLEM Dx:  Anemia: Anemia  Hypertension: Hypertension  Aspiration pneumonia, unspecified aspiration pneumonia type, unspecified laterality, unspecified part of lung: Aspiration pneumonia, unspecified aspiration pneumonia type, unspecified laterality, unspecified part of lung on cefipime  Colon obstruction: Colon obstruction,unable to do colonoscopy,pt is in terminal condition,pt refused NG  Complete intestinal obstruction, unspecified cause: Complete intestinal obstruction, unspecified cause  Colon neoplasm: Colon neoplasm  Aspiration pneumonia: Aspiration pneumonia    hx copd,case d/w son agree for morphine drip,palliative care ,pt is terminal condition,DNR

## 2018-05-19 NOTE — DISCHARGE NOTE FOR THE EXPIRED PATIENT - HOSPITAL COURSE
This is 81 yo female with PMH of COPD and depression was admitted for aspiration pneumonia, found to have colon mass and possible mets to liver.  She developed colonic obstruction. she was high risk for surgery patient , colonoscopy was unable to done. Hospice consulted and patient wanted comfort measurement only.   She was announced dead on 5/19/18 at 10:15 am.

## 2018-06-01 DIAGNOSIS — E78.00 PURE HYPERCHOLESTEROLEMIA, UNSPECIFIED: ICD-10-CM

## 2018-06-01 DIAGNOSIS — C78.7 SECONDARY MALIGNANT NEOPLASM OF LIVER AND INTRAHEPATIC BILE DUCT: ICD-10-CM

## 2018-06-01 DIAGNOSIS — Z88.1 ALLERGY STATUS TO OTHER ANTIBIOTIC AGENTS STATUS: ICD-10-CM

## 2018-06-01 DIAGNOSIS — Z51.5 ENCOUNTER FOR PALLIATIVE CARE: ICD-10-CM

## 2018-06-01 DIAGNOSIS — K52.9 NONINFECTIVE GASTROENTERITIS AND COLITIS, UNSPECIFIED: ICD-10-CM

## 2018-06-01 DIAGNOSIS — C18.9 MALIGNANT NEOPLASM OF COLON, UNSPECIFIED: ICD-10-CM

## 2018-06-01 DIAGNOSIS — J44.9 CHRONIC OBSTRUCTIVE PULMONARY DISEASE, UNSPECIFIED: ICD-10-CM

## 2018-06-01 DIAGNOSIS — Z88.9 ALLERGY STATUS TO UNSPECIFIED DRUGS, MEDICAMENTS AND BIOLOGICAL SUBSTANCES: ICD-10-CM

## 2018-06-01 DIAGNOSIS — R09.02 HYPOXEMIA: ICD-10-CM

## 2018-06-01 DIAGNOSIS — Z88.2 ALLERGY STATUS TO SULFONAMIDES: ICD-10-CM

## 2018-06-01 DIAGNOSIS — M81.0 AGE-RELATED OSTEOPOROSIS WITHOUT CURRENT PATHOLOGICAL FRACTURE: ICD-10-CM

## 2018-06-01 DIAGNOSIS — G40.909 EPILEPSY, UNSPECIFIED, NOT INTRACTABLE, WITHOUT STATUS EPILEPTICUS: ICD-10-CM

## 2018-06-01 DIAGNOSIS — Z66 DO NOT RESUSCITATE: ICD-10-CM

## 2018-06-01 DIAGNOSIS — K56.609 UNSPECIFIED INTESTINAL OBSTRUCTION, UNSPECIFIED AS TO PARTIAL VERSUS COMPLETE OBSTRUCTION: ICD-10-CM

## 2018-06-01 DIAGNOSIS — I10 ESSENTIAL (PRIMARY) HYPERTENSION: ICD-10-CM

## 2018-06-01 DIAGNOSIS — J69.0 PNEUMONITIS DUE TO INHALATION OF FOOD AND VOMIT: ICD-10-CM

## 2018-06-01 DIAGNOSIS — D64.9 ANEMIA, UNSPECIFIED: ICD-10-CM

## 2018-06-01 DIAGNOSIS — F32.9 MAJOR DEPRESSIVE DISORDER, SINGLE EPISODE, UNSPECIFIED: ICD-10-CM

## 2018-06-01 DIAGNOSIS — E87.0 HYPEROSMOLALITY AND HYPERNATREMIA: ICD-10-CM

## 2018-06-01 DIAGNOSIS — E46 UNSPECIFIED PROTEIN-CALORIE MALNUTRITION: ICD-10-CM

## 2018-06-02 DIAGNOSIS — I69.954 HEMIPLEGIA AND HEMIPARESIS FOLLOWING UNSPECIFIED CEREBROVASCULAR DISEASE AFFECTING LEFT NON-DOMINANT SIDE: ICD-10-CM

## 2018-06-02 DIAGNOSIS — R14.0 ABDOMINAL DISTENSION (GASEOUS): ICD-10-CM

## 2018-06-02 DIAGNOSIS — J45.998 OTHER ASTHMA: ICD-10-CM

## 2018-06-02 DIAGNOSIS — Z97.4 PRESENCE OF EXTERNAL HEARING-AID: ICD-10-CM

## 2020-03-08 NOTE — PATIENT PROFILE ADULT. - STAGE
Emergency Department  [address] [place] [id number]  Phone:  [phone]  Fax:  [phone]                                    Ines Mott   MRN: [id number]    Department:   Emergency Department   Date of Visit:  3/8/2020           After Visit Summary Signature Page    I have received my discharge instructions, and my questions have been answered. I have discussed any challenges I see with this plan with the nurse or doctor.    ..........................................................................................................................................  Patient/Patient Representative Signature      ..........................................................................................................................................  Patient Representative Print Name and Relationship to Patient    ..................................................               ................................................  Date                                   Time    ..........................................................................................................................................  Reviewed by Signature/Title    ...................................................              ..............................................  Date                                               Time          22EPIC Rev 08/18       
Stage II

## 2021-07-14 NOTE — PROGRESS NOTE ADULT - I WAS PHYSICALLY PRESENT FOR THE KEY PORTIONS OF THE EVALUATION AND MANAGEMENT (E/M) SERVICE PROVIDED.  I AGREE WITH THE ABOVE HISTORY, PHYSICAL, AND PLAN WHICH I HAVE REVIEWED AND EDITED WHERE APPROPRIATE
Called Joanna and LVM Form placed at  for  copied for scanning  Zee [facility] Unit Coordinator      
Statement Selected

## 2021-08-06 NOTE — CONSULT NOTE ADULT - ASSESSMENT
Patient with colon obstruction. She is a DNR. Her albumin is low. She is poor historian. Risk GI W/U appears moderate. Continue meds. Check lytes. Prognosis poor. DVT prophylaxis Other

## 2023-01-24 NOTE — PATIENT PROFILE ADULT. - NS PRO AD PATIENT TYPE
Time-based billing (NON-critical care) Health Care Proxy (HCP)/Medical Orders for Life-Sustaining Treatment (MOLST)

## 2024-09-19 NOTE — ED ADULT TRIAGE NOTE - WEIGHT IN KG
[Time Spent: ___ minutes] : I have spent [unfilled] minutes of time on the encounter which excludes teaching and separately reported services. 88

## 2025-04-03 NOTE — PATIENT PROFILE ADULT. - TEACHING/LEARNING FACTORS INFLUENCE READINESS TO LEARN
Upon review of the In Basket request and the patient's chart, initial outreach has been made via fax to facility. Please see Contacts section for details.     Thank you  Peggy Calzada    lack of motivation/nonacceptance